# Patient Record
Sex: MALE | Race: BLACK OR AFRICAN AMERICAN | NOT HISPANIC OR LATINO | Employment: OTHER | RURAL
[De-identification: names, ages, dates, MRNs, and addresses within clinical notes are randomized per-mention and may not be internally consistent; named-entity substitution may affect disease eponyms.]

---

## 2020-05-21 ENCOUNTER — HISTORICAL (OUTPATIENT)
Dept: ADMINISTRATIVE | Facility: HOSPITAL | Age: 55
End: 2020-05-21

## 2020-05-21 LAB
ALBUMIN SERPL BCP-MCNC: 2.4 G/DL (ref 3.5–5)
ALBUMIN/GLOB SERPL: 0.5 {RATIO}
ALP SERPL-CCNC: 73 U/L (ref 45–115)
ALT SERPL W P-5'-P-CCNC: 41 U/L (ref 16–61)
AST SERPL W P-5'-P-CCNC: 39 U/L (ref 15–37)
BASOPHILS # BLD AUTO: 0.02 X10E3/UL (ref 0–0.2)
BASOPHILS NFR BLD AUTO: 0.3 % (ref 0–1)
BILIRUB SERPL-MCNC: 0.6 MG/DL (ref 0–1.2)
BUN SERPL-MCNC: 18 MG/DL (ref 7–18)
CALCIUM SERPL-MCNC: 8.2 MG/DL (ref 8.5–10.1)
CHLORIDE SERPL-SCNC: 101 MMOL/L (ref 98–107)
CO2 SERPL-SCNC: 28 MMOL/L (ref 21–32)
CREAT SERPL-MCNC: 2.05 MG/DL (ref 0.7–1.3)
EOSINOPHIL # BLD AUTO: 0.05 X10E3/UL (ref 0–0.5)
EOSINOPHIL NFR BLD AUTO: 0.6 % (ref 1–4)
ERYTHROCYTE [DISTWIDTH] IN BLOOD BY AUTOMATED COUNT: 15.4 % (ref 11.5–14.5)
GLOBULIN SER-MCNC: 4.6 G/DL (ref 2–4)
GLUCOSE SERPL-MCNC: 113 MG/DL (ref 74–106)
HCT VFR BLD AUTO: 32.2 % (ref 40–54)
HGB BLD-MCNC: 10.4 G/DL (ref 13.5–18)
LYMPHOCYTES # BLD AUTO: 2.66 X10E3/UL (ref 1–4.8)
LYMPHOCYTES NFR BLD AUTO: 34 % (ref 27–41)
MCH RBC QN AUTO: 26.5 PG (ref 27–31)
MCHC RBC AUTO-ENTMCNC: 32.3 G/DL (ref 32–36)
MCV RBC AUTO: 82 FL (ref 80–96)
MONOCYTES # BLD AUTO: 0.71 X10E3/UL (ref 0–0.8)
MONOCYTES NFR BLD AUTO: 9.1 % (ref 2–6)
MPC BLD CALC-MCNC: 9.8 FL (ref 9.4–12.4)
NEUTROPHILS # BLD AUTO: 4.39 X10E3/UL (ref 1.8–7.7)
NEUTROPHILS NFR BLD AUTO: 56 % (ref 53–65)
NT-PROBNP SERPL-MCNC: ABNORMAL PG/ML (ref 0–125)
PLATELET # BLD AUTO: 330 X10E3/UL (ref 150–450)
POTASSIUM SERPL-SCNC: 3.8 MMOL/L (ref 3.5–5.1)
PROT SERPL-MCNC: 7 G/DL (ref 6.4–8.2)
RBC # BLD AUTO: 3.93 X10E6/UL (ref 4.6–6.2)
SODIUM SERPL-SCNC: 137 MMOL/L (ref 136–145)
TROPONIN I SERPL-MCNC: 0.14 NG/ML (ref 0–0.06)
WBC # BLD AUTO: 7.83 X10E3/UL (ref 4.5–11)

## 2021-01-04 ENCOUNTER — HISTORICAL (OUTPATIENT)
Dept: ADMINISTRATIVE | Facility: HOSPITAL | Age: 56
End: 2021-01-04

## 2021-01-04 LAB
ALBUMIN SERPL BCP-MCNC: 4 G/DL (ref 3.5–5)
ALBUMIN/GLOB SERPL: 0.8 {RATIO}
ALP SERPL-CCNC: 57 U/L (ref 45–115)
ALT SERPL W P-5'-P-CCNC: 33 U/L (ref 16–61)
ANION GAP SERPL CALCULATED.3IONS-SCNC: 16 MMOL/L (ref 7–16)
APTT PPP: 25.9 SECONDS (ref 25.2–37.3)
AST SERPL W P-5'-P-CCNC: 86 U/L (ref 15–37)
BASOPHILS # BLD AUTO: 0.05 X10E3/UL (ref 0–0.2)
BASOPHILS NFR BLD AUTO: 0.5 % (ref 0–1)
BILIRUB SERPL-MCNC: 1.3 MG/DL (ref 0–1.2)
BUN SERPL-MCNC: 34 MG/DL (ref 7–18)
CALCIUM SERPL-MCNC: 9.8 MG/DL (ref 8.5–10.1)
CHLORIDE SERPL-SCNC: 102 MMOL/L (ref 98–107)
CO2 SERPL-SCNC: 25 MMOL/L (ref 21–32)
CREAT SERPL-MCNC: 1.98 MG/DL (ref 0.7–1.3)
EOSINOPHIL # BLD AUTO: 0.02 X10E3/UL (ref 0–0.5)
EOSINOPHIL NFR BLD AUTO: 0.2 % (ref 1–4)
ERYTHROCYTE [DISTWIDTH] IN BLOOD BY AUTOMATED COUNT: 14.6 % (ref 11.5–14.5)
ETHANOL SERPL-MCNC: <3 MG/DL (ref 0–10)
GLOBULIN SER-MCNC: 5.2 G/DL (ref 2–4)
GLUCOSE SERPL-MCNC: 109 MG/DL (ref 70–105)
GLUCOSE SERPL-MCNC: 114 MG/DL (ref 74–106)
HCT VFR BLD AUTO: 44 % (ref 40–54)
HGB BLD-MCNC: 15 G/DL (ref 13.5–18)
INR BLD: 1.1 (ref 0.9–1.1)
LYMPHOCYTES # BLD AUTO: 2.01 X10E3/UL (ref 1–4.8)
LYMPHOCYTES NFR BLD AUTO: 19.2 % (ref 27–41)
MAGNESIUM SERPL-MCNC: 2.6 MG/DL (ref 1.7–2.3)
MCH RBC QN AUTO: 28.2 PG (ref 27–31)
MCHC RBC AUTO-ENTMCNC: 34.1 G/DL (ref 32–36)
MCV RBC AUTO: 83 FL (ref 80–96)
MONOCYTES # BLD AUTO: 1 X10E3/UL (ref 0–0.8)
MONOCYTES NFR BLD AUTO: 9.5 % (ref 2–6)
MPC BLD CALC-MCNC: 11.6 FL (ref 9.4–12.4)
NEUTROPHILS # BLD AUTO: 7.4 X10E3/UL (ref 1.8–7.7)
NEUTROPHILS NFR BLD AUTO: 70.6 % (ref 53–65)
NT-PROBNP SERPL-MCNC: 6109 PG/ML (ref 0–125)
PLATELET # BLD AUTO: 167 X10E3/UL (ref 150–450)
POTASSIUM SERPL-SCNC: 5.1 MMOL/L (ref 3.5–5.1)
PROT SERPL-MCNC: 9.2 G/DL (ref 6.4–8.2)
PROTHROMBIN TIME: 13.7 SECONDS (ref 11.7–14.7)
RBC # BLD AUTO: 5.32 X10E6/UL (ref 4.6–6.2)
SODIUM SERPL-SCNC: 138 MMOL/L (ref 136–145)
TROPONIN I SERPL-MCNC: 0.54 NG/ML (ref 0–0.06)
WBC # BLD AUTO: 10.48 X10E3/UL (ref 4.5–11)

## 2022-03-03 ENCOUNTER — HOSPITAL ENCOUNTER (EMERGENCY)
Facility: HOSPITAL | Age: 57
Discharge: LEFT BEFORE DISCHARGE | End: 2022-03-03

## 2022-03-03 VITALS
OXYGEN SATURATION: 99 % | RESPIRATION RATE: 18 BRPM | HEART RATE: 83 BPM | TEMPERATURE: 99 F | WEIGHT: 207 LBS | HEIGHT: 66 IN | DIASTOLIC BLOOD PRESSURE: 98 MMHG | BODY MASS INDEX: 33.27 KG/M2 | SYSTOLIC BLOOD PRESSURE: 156 MMHG

## 2022-03-03 DIAGNOSIS — I16.0 HYPERTENSIVE URGENCY: Primary | ICD-10-CM

## 2022-03-03 DIAGNOSIS — R51.9 ACUTE NONINTRACTABLE HEADACHE, UNSPECIFIED HEADACHE TYPE: ICD-10-CM

## 2022-03-03 DIAGNOSIS — Z53.29 LEFT AGAINST MEDICAL ADVICE: ICD-10-CM

## 2022-03-03 DIAGNOSIS — R79.89 ELEVATED TROPONIN I LEVEL: ICD-10-CM

## 2022-03-03 DIAGNOSIS — R51.9 SEVERE HEADACHE: ICD-10-CM

## 2022-03-03 LAB
ALBUMIN SERPL BCP-MCNC: 2.9 G/DL (ref 3.5–5)
ALBUMIN/GLOB SERPL: 0.6 {RATIO}
ALP SERPL-CCNC: 64 U/L (ref 45–115)
ALT SERPL W P-5'-P-CCNC: 11 U/L (ref 16–61)
AMPHET UR QL SCN: NEGATIVE
ANION GAP SERPL CALCULATED.3IONS-SCNC: 10 MMOL/L (ref 7–16)
APTT PPP: 28.5 SECONDS (ref 25.2–37.3)
AST SERPL W P-5'-P-CCNC: 17 U/L (ref 15–37)
BACTERIA #/AREA URNS HPF: NORMAL /HPF
BARBITURATES UR QL SCN: NEGATIVE
BASOPHILS # BLD AUTO: 0.02 K/UL (ref 0–0.2)
BASOPHILS NFR BLD AUTO: 0.3 % (ref 0–1)
BENZODIAZ METAB UR QL SCN: NEGATIVE
BILIRUB SERPL-MCNC: 0.3 MG/DL (ref 0–1.2)
BILIRUB UR QL STRIP: NEGATIVE
BUN SERPL-MCNC: 19 MG/DL (ref 7–18)
BUN/CREAT SERPL: 11 (ref 6–20)
CALCIUM SERPL-MCNC: 8.7 MG/DL (ref 8.5–10.1)
CHLORIDE SERPL-SCNC: 100 MMOL/L (ref 98–107)
CLARITY UR: CLEAR
CO2 SERPL-SCNC: 31 MMOL/L (ref 21–32)
COCAINE UR QL SCN: NEGATIVE
COLOR UR: ABNORMAL
CREAT SERPL-MCNC: 1.68 MG/DL (ref 0.7–1.3)
DIFFERENTIAL METHOD BLD: ABNORMAL
EOSINOPHIL # BLD AUTO: 0.07 K/UL (ref 0–0.5)
EOSINOPHIL NFR BLD AUTO: 1.1 % (ref 1–4)
ERYTHROCYTE [DISTWIDTH] IN BLOOD BY AUTOMATED COUNT: 13.9 % (ref 11.5–14.5)
GLOBULIN SER-MCNC: 4.8 G/DL (ref 2–4)
GLUCOSE SERPL-MCNC: 104 MG/DL (ref 74–106)
GLUCOSE UR STRIP-MCNC: NEGATIVE MG/DL
HCT VFR BLD AUTO: 45 % (ref 40–54)
HGB BLD-MCNC: 15.1 G/DL (ref 13.5–18)
INR BLD: 0.94 (ref 0.9–1.1)
KETONES UR STRIP-SCNC: ABNORMAL MG/DL
LEUKOCYTE ESTERASE UR QL STRIP: ABNORMAL
LYMPHOCYTES # BLD AUTO: 2.02 K/UL (ref 1–4.8)
LYMPHOCYTES NFR BLD AUTO: 32.6 % (ref 27–41)
MCH RBC QN AUTO: 27.5 PG (ref 27–31)
MCHC RBC AUTO-ENTMCNC: 33.6 G/DL (ref 32–36)
MCV RBC AUTO: 81.8 FL (ref 80–96)
MDA UR QL SCN: NEGATIVE
METHADONE UR QL SCN: NEGATIVE
METHAMPHET UR QL SCN: POSITIVE
MONOCYTES # BLD AUTO: 0.65 K/UL (ref 0–0.8)
MONOCYTES NFR BLD AUTO: 10.5 % (ref 2–6)
MPC BLD CALC-MCNC: 10.2 FL (ref 9.4–12.4)
NEUTROPHILS # BLD AUTO: 3.44 K/UL (ref 1.8–7.7)
NEUTROPHILS NFR BLD AUTO: 55.5 % (ref 53–65)
NITRITE UR QL STRIP: NEGATIVE
OPIATES UR QL SCN: NEGATIVE
OXYCODONE UR QL SCN: NEGATIVE
PCP UR QL SCN: NEGATIVE
PH UR STRIP: 5 PH UNITS
PLATELET # BLD AUTO: 243 K/UL (ref 150–400)
POTASSIUM SERPL-SCNC: 3.9 MMOL/L (ref 3.5–5.1)
PROT SERPL-MCNC: 7.7 G/DL (ref 6.4–8.2)
PROT UR QL STRIP: 100
PROTHROMBIN TIME: 12.7 SECONDS (ref 11.7–14.7)
RBC # BLD AUTO: 5.5 M/UL (ref 4.6–6.2)
RBC # UR STRIP: ABNORMAL /UL
RBC #/AREA URNS HPF: NORMAL /HPF
SODIUM SERPL-SCNC: 137 MMOL/L (ref 136–145)
SP GR UR STRIP: 1.02
THC UR QL SCN: NEGATIVE
TRICYCLICS UR QL SCN: NEGATIVE
TROPONIN I SERPL HS-MCNC: 106.5 PG/ML
UROBILINOGEN UR STRIP-ACNC: 0.2 MG/DL
WBC # BLD AUTO: 6.2 K/UL (ref 4.5–11)
WBC #/AREA URNS HPF: NORMAL /HPF

## 2022-03-03 PROCEDURE — 85730 THROMBOPLASTIN TIME PARTIAL: CPT | Performed by: NURSE PRACTITIONER

## 2022-03-03 PROCEDURE — 99284 EMERGENCY DEPT VISIT MOD MDM: CPT | Mod: ,,, | Performed by: NURSE PRACTITIONER

## 2022-03-03 PROCEDURE — 99284 EMERGENCY DEPT VISIT MOD MDM: CPT | Mod: 25

## 2022-03-03 PROCEDURE — 85025 COMPLETE CBC W/AUTO DIFF WBC: CPT | Performed by: NURSE PRACTITIONER

## 2022-03-03 PROCEDURE — 80053 COMPREHEN METABOLIC PANEL: CPT | Performed by: NURSE PRACTITIONER

## 2022-03-03 PROCEDURE — 85610 PROTHROMBIN TIME: CPT | Performed by: NURSE PRACTITIONER

## 2022-03-03 PROCEDURE — 80305 DRUG TEST PRSMV DIR OPT OBS: CPT | Performed by: NURSE PRACTITIONER

## 2022-03-03 PROCEDURE — 93010 ELECTROCARDIOGRAM REPORT: CPT | Mod: ,,, | Performed by: INTERNAL MEDICINE

## 2022-03-03 PROCEDURE — 93005 ELECTROCARDIOGRAM TRACING: CPT

## 2022-03-03 PROCEDURE — 96361 HYDRATE IV INFUSION ADD-ON: CPT

## 2022-03-03 PROCEDURE — 93010 EKG 12-LEAD: ICD-10-PCS | Mod: ,,, | Performed by: INTERNAL MEDICINE

## 2022-03-03 PROCEDURE — 84484 ASSAY OF TROPONIN QUANT: CPT | Performed by: NURSE PRACTITIONER

## 2022-03-03 PROCEDURE — 81001 URINALYSIS AUTO W/SCOPE: CPT | Performed by: NURSE PRACTITIONER

## 2022-03-03 PROCEDURE — 96375 TX/PRO/DX INJ NEW DRUG ADDON: CPT

## 2022-03-03 PROCEDURE — 99499 NO LOS: ICD-10-PCS | Mod: S$PBB,,, | Performed by: PSYCHIATRY & NEUROLOGY

## 2022-03-03 PROCEDURE — 36415 COLL VENOUS BLD VENIPUNCTURE: CPT | Performed by: NURSE PRACTITIONER

## 2022-03-03 PROCEDURE — 99499 UNLISTED E&M SERVICE: CPT | Mod: S$PBB,,, | Performed by: PSYCHIATRY & NEUROLOGY

## 2022-03-03 PROCEDURE — 25000003 PHARM REV CODE 250: Performed by: NURSE PRACTITIONER

## 2022-03-03 PROCEDURE — 99284 PR EMERGENCY DEPT VISIT,LEVEL IV: ICD-10-PCS | Mod: ,,, | Performed by: NURSE PRACTITIONER

## 2022-03-03 PROCEDURE — 63600175 PHARM REV CODE 636 W HCPCS: Performed by: NURSE PRACTITIONER

## 2022-03-03 PROCEDURE — 96376 TX/PRO/DX INJ SAME DRUG ADON: CPT

## 2022-03-03 PROCEDURE — 96374 THER/PROPH/DIAG INJ IV PUSH: CPT

## 2022-03-03 RX ORDER — HYDROCHLOROTHIAZIDE 12.5 MG/1
12.5 TABLET ORAL
Status: COMPLETED | OUTPATIENT
Start: 2022-03-03 | End: 2022-03-03

## 2022-03-03 RX ORDER — LISINOPRIL 40 MG/1
40 TABLET ORAL DAILY
Qty: 30 TABLET | Refills: 0 | Status: SHIPPED | OUTPATIENT
Start: 2022-03-03 | End: 2022-10-21

## 2022-03-03 RX ORDER — ASPIRIN 325 MG
325 TABLET ORAL
Status: COMPLETED | OUTPATIENT
Start: 2022-03-03 | End: 2022-03-03

## 2022-03-03 RX ORDER — LABETALOL HYDROCHLORIDE 5 MG/ML
10 INJECTION, SOLUTION INTRAVENOUS
Status: COMPLETED | OUTPATIENT
Start: 2022-03-03 | End: 2022-03-03

## 2022-03-03 RX ORDER — HYDROCHLOROTHIAZIDE 12.5 MG/1
12.5 TABLET ORAL DAILY
Qty: 30 TABLET | Refills: 0 | Status: SHIPPED | OUTPATIENT
Start: 2022-03-03 | End: 2022-09-27

## 2022-03-03 RX ORDER — LISINOPRIL 20 MG/1
40 TABLET ORAL
Status: COMPLETED | OUTPATIENT
Start: 2022-03-03 | End: 2022-03-03

## 2022-03-03 RX ORDER — KETOROLAC TROMETHAMINE 30 MG/ML
15 INJECTION, SOLUTION INTRAMUSCULAR; INTRAVENOUS
Status: COMPLETED | OUTPATIENT
Start: 2022-03-03 | End: 2022-03-03

## 2022-03-03 RX ADMIN — LABETALOL HYDROCHLORIDE 10 MG: 5 INJECTION INTRAVENOUS at 05:03

## 2022-03-03 RX ADMIN — LABETALOL HYDROCHLORIDE 10 MG: 5 INJECTION INTRAVENOUS at 03:03

## 2022-03-03 RX ADMIN — SODIUM CHLORIDE 1000 ML: 9 INJECTION, SOLUTION INTRAVENOUS at 05:03

## 2022-03-03 RX ADMIN — HYDROCHLOROTHIAZIDE 12.5 MG: 12.5 TABLET ORAL at 05:03

## 2022-03-03 RX ADMIN — LISINOPRIL 40 MG: 20 TABLET ORAL at 05:03

## 2022-03-03 RX ADMIN — ASPIRIN 325 MG ORAL TABLET 325 MG: 325 PILL ORAL at 05:03

## 2022-03-03 RX ADMIN — KETOROLAC TROMETHAMINE 15 MG: 30 INJECTION, SOLUTION INTRAMUSCULAR at 05:03

## 2022-03-03 NOTE — ED PROVIDER NOTES
Encounter Date: 3/3/2022       History     Chief Complaint   Patient presents with    Headache     56-year-old  male with history of hypertension, heart disease, drug abuse and intracranial vasculopathy presents to the emergency room today with right frontal headache for the past 3 days.  Patient reports he has been out of his blood pressure medications for the past 3 months.  Patient has no PCP.  Patient denies any dizziness, fever, sensitivity to light/sound, vision changes, neck pain, chest pain, shortness of breath.  When reviewing the patient's past medical history is, noted that patient was having headaches when the intracranial vasculopathy was diagnosed.  Patient reports this headache is, way worse than that.  Patient was followed by Neurology at a hospital in [place].    The history is provided by the patient.   Headache   This is a new problem. Episode onset: 3 days. The problem occurs constantly. The problem has been unchanged. The pain is located in the frontal region. The pain does not radiate. The pain quality is not similar to prior headaches. The quality of the pain is described as throbbing. The pain is at a severity of 5/10. Pertinent negatives include no abdominal pain, abnormal behavior, anorexia, back pain, blurred vision, coughing, dizziness, drainage, ear pain, eye pain, eye redness, eye watering, facial sweating, fever, hearing loss, insomnia, loss of balance, muscle aches, nausea, neck pain, numbness, phonophobia, photophobia, rhinorrhea, scalp tenderness, seizures, sinus pressure, sore throat, swollen glands, tingling, tinnitus, visual change, vomiting, weakness or weight loss. Nothing aggravates the symptoms. He has tried NSAIDs for the symptoms. The treatment provided no relief. His past medical history is significant for hypertension and obesity. There is no history of cancer, cluster headaches, immunosuppression, migraine headaches, pseudotumor cerebri, recent head  traumas, sinus disease or TMJ.     Review of patient's allergies indicates:  No Known Allergies  Past Medical History:   Diagnosis Date    Hypertension     Mixed hyperlipidemia      Past Surgical History:   Procedure Laterality Date    CARDIAC SURGERY       History reviewed. No pertinent family history.  Social History     Tobacco Use    Smoking status: Never Smoker    Smokeless tobacco: Current User     Types: Snuff   Substance Use Topics    Alcohol use: Yes     Alcohol/week: 12.0 standard drinks     Types: 12 Cans of beer per week    Drug use: Not Currently     Review of Systems   Constitutional: Negative for fever and weight loss.   HENT: Negative for ear pain, hearing loss, rhinorrhea, sinus pressure, sore throat and tinnitus.    Eyes: Negative for blurred vision, photophobia, pain and redness.   Respiratory: Negative for cough and shortness of breath.    Cardiovascular: Negative for chest pain.   Gastrointestinal: Negative for abdominal pain, anorexia, nausea and vomiting.   Genitourinary: Negative for dysuria.   Musculoskeletal: Negative for back pain and neck pain.   Skin: Negative for rash.   Neurological: Positive for headaches. Negative for dizziness, tingling, tremors, seizures, syncope, facial asymmetry, speech difficulty, weakness, light-headedness, numbness and loss of balance.   Hematological: Does not bruise/bleed easily.   Psychiatric/Behavioral: The patient does not have insomnia.        Physical Exam     Initial Vitals [03/03/22 1455]   BP Pulse Resp Temp SpO2   (!) 206/135 98 18 99.2 °F (37.3 °C) 98 %      MAP       --         Physical Exam    Nursing note and vitals reviewed.  Constitutional: He appears well-developed and well-nourished. He is not diaphoretic. No distress.   HENT:   Head: Normocephalic and atraumatic.   Eyes: Pupils are equal, round, and reactive to light. Right pupil is round and reactive. Left pupil is round and reactive. Pupils are equal.   Neck: Neck supple.    Cardiovascular: Normal rate, regular rhythm, normal heart sounds and intact distal pulses. Exam reveals no gallop and no friction rub.    No murmur heard.  Pulmonary/Chest: Breath sounds normal. No respiratory distress. He has no wheezes. He has no rhonchi. He has no rales. He exhibits no tenderness.   Musculoskeletal:      Cervical back: Neck supple.     Neurological: He is alert and oriented to person, place, and time. He has normal strength. He is not disoriented. No cranial nerve deficit. GCS score is 15. GCS eye subscore is 4. GCS verbal subscore is 5. GCS motor subscore is 6.   Skin: Skin is warm and dry. Capillary refill takes less than 2 seconds.   Psychiatric: He has a normal mood and affect.         Medical Screening Exam   See Full Note    ED Course   Procedures  Labs Reviewed   COMPREHENSIVE METABOLIC PANEL - Abnormal; Notable for the following components:       Result Value    BUN 19 (*)     Creatinine 1.68 (*)     Albumin 2.9 (*)     Globulin 4.8 (*)     ALT 11 (*)     eGFR 45 (*)     All other components within normal limits   URINALYSIS, REFLEX TO URINE CULTURE - Abnormal; Notable for the following components:    Leukocytes, UA Trace (*)     Protein,   (*)     Blood, UA Small (*)     All other components within normal limits   DRUG SCREEN, URINE (BEAKER) - Abnormal; Notable for the following components:    Methamphetamines Positive (*)     All other components within normal limits   CBC WITH DIFFERENTIAL - Abnormal; Notable for the following components:    Monocytes % 10.5 (*)     All other components within normal limits   TROPONIN I - Abnormal; Notable for the following components:    Troponin I High Sensitivity 106.5 (*)     All other components within normal limits   URINALYSIS, MICROSCOPIC - Normal   APTT - Normal   PROTIME-INR - Normal   CBC W/ AUTO DIFFERENTIAL    Narrative:     The following orders were created for panel order CBC auto differential.  Procedure                                Abnormality         Status                     ---------                               -----------         ------                     CBC with Differential[[id number]]        Abnormal            Final result                 Please view results for these tests on the individual orders.     EKG Readings: (Independently Interpreted)   Initial Reading: No STEMI. Rhythm: Normal Sinus Rhythm. Heart Rate: 92. Ectopy: No Ectopy. Conduction: Normal. ST Segments: Normal ST Segments. T Waves: Normal. Axis: Normal. Other Findings: Prolonged QT Interval. Clinical Impression: Normal Sinus Rhythm     ECG Results          EKG 12-lead (In process)  Result time 03/03/22 17:12:52    In process by Interface, Lab In [facility] (03/03/22 17:12:52)                 Narrative:    Test Reason : R51.9,    Vent. Rate : 092 BPM     Atrial Rate : 092 BPM     P-R Int : 118 ms          QRS Dur : 088 ms      QT Int : 378 ms       P-R-T Axes : 040 -32 116 degrees     QTc Int : 467 ms    Normal sinus rhythm  Left axis deviation  Nonspecific T wave abnormality  Prolonged QT  Abnormal ECG  No previous ECGs available    Referred By: [email or web address]   SELF           Confirmed By:                             Imaging Results          X-Ray Chest AP Portable (In process)                CT Head Without Contrast (Final result)  Result time 03/03/22 15:28:35    Final result by Baltazar Piedra DO (03/03/22 15:28:35)                 Impression:      No convincing evidence of acute intracranial hemorrhage. There is patchy subcortical hypoattenuation which is greatest within the left parietal region which is nonspecific but may reflect sequela of chronic microvascular ischemic change.  Demyelinating processes and vasculitis also within the differential.  MRI may be of benefit for further evaluation including too small areas of acute ischemia.    The CT exam was performed using one or more of the following dose    reduction techniques- Automated exposure control,  adjustment of the mA    and/or kV according to patient size, and/or use of iterative    reconstructed technique.    Point of Service: [facility]      Electronically signed by: Baltazar Piedra  Date:    03/03/2022  Time:    15:28             Narrative:    EXAMINATION:  CT HEAD WITHOUT CONTRAST    CLINICAL HISTORY:  HA, HTN, hx of intracranial vasculopathy;    COMPARISON:  None    TECHNIQUE:  Multiple axial tomographic images of the brain were obtained without the use of intravenous contrast.  .    FINDINGS:  Midline structures are nondisplaced.  No convincing evidence of acute intracranial hemorrhage. No convincing evidence of hydrocephalus.  There is patchy subcortical hypoattenuation which is greatest within the left parietal region which is nonspecific but may reflect sequela of chronic microvascular ischemic change. Visualized paranasal sinuses and mastoid air cells are predominantly clear.                                 Medications   labetaloL injection 10 mg (10 mg Intravenous Given 3/3/22 1557)   labetaloL injection 10 mg (10 mg Intravenous Given 3/3/22 1720)   sodium chloride 0.9% bolus 1,000 mL (0 mLs Intravenous Stopped 3/3/22 1817)   ketorolac injection 15 mg (15 mg Intravenous Given 3/3/22 1718)   lisinopriL tablet 40 mg (40 mg Oral Given 3/3/22 1749)   hydroCHLOROthiazide tablet 12.5 mg (12.5 mg Oral Given 3/3/22 1749)   aspirin tablet 325 mg (325 mg Oral Given 3/3/22 1743)                 ED Course as of 03/03/22 1923   u Mar 03, 2022   1533 Methamphetamines(!): Positive [AG]   1538 Occult Blood UA(!): Small [AG]   1538 Protein, UA(!): 100  [AG]   1541 Spoke with Dr Gonzalez's nurse at [facility] to get consult.  She reports Dr Gonzalez is out of the country at this time.  However, she is going to call their nurse practitioner. [AG]   1551 BUN(!): 19 [AG]   1551 Creatinine(!): 1.68 [AG]   1620 CTA H/N 4/15/21:  IMPRESSION:  1. No acute intracranial findings.  2. Stable patchy  hypodensities in bilateral periventricular and  subcortical white matter, in keeping with microangiopathic changes or  vasculitis.  3. No significant atherosclerotic disease in the neck arteries. Stable  multifocal stenosis in bilateral MCA M2 and M3 segments, proximal  basilar trunk, A2 segment of the right EITAN. No new areas of stenosis.  This may represent vasculitis or related to drug abuse as described  before. Intracranial atherosclerotic disease cannot be ruled out.  4. Reactive lymph nodes in the neck with interval enlargement of the  level 1A lymph node.     [AG]   1624 Patient reports he has not been taking his ASA or any of his prescribed medications. [AG]   1707 Pt reports HA is 5 or 6 on pain scale of 0-10. Telemed consult with Dr Nazario.  He is currently reviewing the CTA from 2021 and the CT from today, then he will do the visual consult with the patient.  He says to treat his HA and repeat the labetolol.  We will then give patient his home dosing of Lisinopril and HCTZ. [AG]   1726 Dr Sin did audio/visual consult with the patient.  He recommends treating the blood pressure and HA.  Will also be getting [facility] consult.   [AG]   1819 Consulted Dr Charlton with [facility].  Dr Teran is concerned for posterior reversible encephalopathy syndrome (PRES).  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  He recommends admission to a facility where he can get MRI, neuro consult and management of his HTN [AG]   1827 Talk to Dr. Piedra the radiologist with [facility] regarding the [place] head.  He reports there is a dictation error and the last sentence of the impression.  That sentence is supposed to say MRI may be of benefit for further evaluation including to exclude areas of acute ischemia. [AG]   1840 Troponin I High Sensitivity(!!): 106.5  No ST elevation on EKG. [AG]   1916 Pt is refusing to be transferred anywhere.  I have advised to the patient that is he  leaves there is risks of worsening of his condition or death.  He verbalizes understanding of this.  He is alert and oriented x3.  Denies HA at this time.  I explained to the patient that if he worsens, he needs to go to the closest emergency room.  I explained to the patient he needs close follow-up with a PCP, neurology and cardiology.  He verbalizes understanding of this.  I am going to send patient home with his blood pressure medication and I instructed him to get OTC ASA 81mg to take daily.  I also did counseling of the illicit drug use. [AG]      ED Course User Index  [AG] ROYER Dang          Clinical Impression:   Final diagnoses:  [R51.9] Severe headache  [R51.9] Acute nonintractable headache, unspecified headache type  [I16.0] Hypertensive urgency (Primary)  [R77.8] Elevated troponin I level  [Z53.29] Left against medical advice          ED Disposition Condition    AMA               ROYER Dang  03/03/22 1923

## 2022-03-03 NOTE — ED TRIAGE NOTES
Presented to ER with c/o a headache for 3 days Stated he has been out of his b/p  medication for approx 3 month. Also, c/o a toothache on the bottom right.

## 2022-03-04 NOTE — ED NOTES
Tele stroke consult faxed to [facility]. Called the [facility] to confirm they received the consult and answered basic questions regarding pt.

## 2022-03-04 NOTE — CONSULTS
[facility]  Vascular Neurology  Comprehensive Stroke Center  TeleVascular Neurology Acute Consultation Note      Consults    Consulting Provider: KHAI LIM  Current Providers  No providers found    Patient Location:  [facility] EMERGENCY DEPART* Emergency Department  Spoke hospital nurse at bedside with patient assisting consultant.     Patient information was obtained from primary team.         Assessment/Plan:   Patient at [facility] with headache x 3-4 days and hypertension. NIHSS=0 per ER NP.     CT READ: Yes  No hemmorhage. No mass effect. No early infarct signs. Sub-cortical white matter left parietal and temporal hypodensity changes. No convincing evidence of acute intracranial hemorrhage. There is patchy subcortical hypoattenuation which is greatest within the left parietal region which is nonspecific but may reflect sequela of chronic microvascular ischemic change.  Demyelinating processes and vasculitis also within the differential.  MRI may be of benefit for further evaluation including too small areas of acute ischemia.        I recommended neuro consult and admission for MRI. Given symptoms of headache, hypertension and white matter hypodensity in left parietal and temporal regions, it could be PRES    Aspirin, brain vessel imaging along with MRI brain and BP control.       Diagnoses:   Stroke like symptoms    STROKE DOCUMENTATION     Acute Stroke Times:   Acute Stroke Times   Unknown Normal Date: Unknown Date  Unknown Normal Time: Unknown Time  Unknown Symptom Onset Time: Unknown Time  Stroke Team Called Date: 03/03/22  Stroke Team Called Time: 1759  Stroke Team Arrival Date: 03/03/22  Stroke Team Arrival Time: 1759  Alteplase Recommended: No    NIH Scale:  1a. Level of Consciousness: 0-->Alert, keenly responsive  1b. LOC Questions: 0-->Answers both questions correctly  1c. LOC Commands: 0-->Performs both tasks correctly  2. Best Gaze: 0-->Normal  3. Visual:  "0-->No visual loss  4. Facial Palsy: 0-->Normal symmetrical movements  5a. Motor Arm, Left: 0-->No drift, limb holds 90 (or 45) degrees for full 10 secs  5b. Motor Arm, Right: 0-->No drift, limb holds 90 (or 45) degrees for full 10 secs  6a. Motor Leg, Left: 0-->No drift, leg holds 30 degree position for full 5 secs  6b. Motor Leg, Right: 0-->No drift, leg holds 30 degree position for full 5 secs  7. Limb Ataxia: 0-->Absent  8. Sensory: 0-->Normal, no sensory loss  9. Best Language: 0-->No aphasia, normal  10. Dysarthria: 0-->Normal  11. Extinction and Inattention (formerly Neglect): 0-->No abnormality  Total (NIH Stroke Scale): 0     Modified Mineral    Deysi Coma Scale:    ABCD2 Score:    [id number] Score:   HAS -BLED Score:   ICH Score:   Hunt & Adam Classification:       Blood pressure (!) 156/98, pulse 83, temperature 99.2 °F (37.3 °C), temperature source Oral, resp. rate 18, height 5' 6" (1.676 m), weight 93.9 kg (207 lb), SpO2 99 %.  Alteplase Eligible?: No  Alteplase Recommendation: Alteplase not recommended due to Outside of treatment window   Possible Interventional Revascularization Candidate? No; at this time symptoms not suggestive of large vessel occlusion    Disposition Recommendation: admit to inpatient  transfer to nearest appropriate facility     Subjective:     History of Present Illness:  Patient at [facility] with headache x 3-4 days and hypertension.          Woke up with symptoms?: no    Recent bleeding noted: no  Does the patient take any Blood Thinners? no  Medications: No relevant medications      Past Medical History: hypertension    Past Surgical History: no major surgeries within the last 2 weeks    Family History: no relevant history    Social History: unable to obtain    Allergies: No Known Allergies No relevant allergies    Review of Systems  Objective:   Vitals: Blood pressure (!) 156/98, pulse 83, temperature 99.2 °F (37.3 °C), temperature source Oral, resp. rate 18, height " "5' 6" (1.676 m), weight 93.9 kg (207 lb), SpO2 99 %. BP: 206/135    CT READ: Yes  No hemmorhage. No mass effect. No early infarct signs. Sub-cortical white matter left parietal and temporal hypodensity changes.     Physical Exam          Recommended the emergency room physician to have a brief discussion with the patient and/or family if available regarding the  risks and benefits of treatment, and to briefly document the occurrence of that discussion in his clinical encounter note.     The attending portion of this evaluation, treatment, and documentation was performed per Emiliana Charlton MD via audio only.    Billing code: No LOS (audio only consult)    In your opinion, this was a: Tier 2 Van Negative    Consult End Time: 9:12 PM     Emiliana Charlton MD  [facility]  Vascular Neurology   [facility]  "

## 2022-03-04 NOTE — DISCHARGE INSTRUCTIONS
Low-sodium diet.  Take medications as prescribed.  Keep a log of your blood pressure to take to your family practice clinic for your appointment.  I suggest you follow up 1st thing in the morning.  If your symptoms worsen, go to the nearest emergency room for evaluation.  Get over-the-counter aspirin 81 mg to take daily.  Please return to the emergency room for any concerning symptoms.  Please stop using illicit drugs.

## 2022-03-04 NOTE — SUBJECTIVE & OBJECTIVE
"  Woke up with symptoms?: no    Recent bleeding noted: no  Does the patient take any Blood Thinners? no  Medications: No relevant medications      Past Medical History: hypertension    Past Surgical History: no major surgeries within the last 2 weeks    Family History: no relevant history    Social History: unable to obtain    Allergies: No Known Allergies No relevant allergies    Review of Systems  Objective:   Vitals: Blood pressure (!) 156/98, pulse 83, temperature 99.2 °F (37.3 °C), temperature source Oral, resp. rate 18, height 5' 6" (1.676 m), weight 93.9 kg (207 lb), SpO2 99 %. BP: 206/135    CT READ: Yes  No hemmorhage. No mass effect. No early infarct signs. Sub-cortical white matter left parietal and temporal hypodensity changes.     Physical Exam      "

## 2022-08-30 ENCOUNTER — HOSPITAL ENCOUNTER (EMERGENCY)
Facility: HOSPITAL | Age: 57
Discharge: HOME | End: 2022-08-30
Payer: MEDICAID

## 2022-08-30 VITALS
HEIGHT: 65 IN | OXYGEN SATURATION: 100 % | WEIGHT: 165 LBS | SYSTOLIC BLOOD PRESSURE: 120 MMHG | TEMPERATURE: 98 F | HEART RATE: 89 BPM | RESPIRATION RATE: 18 BRPM | DIASTOLIC BLOOD PRESSURE: 74 MMHG | BODY MASS INDEX: 27.49 KG/M2

## 2022-08-30 DIAGNOSIS — M10.9 GOUTY ARTHRITIS OF BOTH KNEES: Primary | ICD-10-CM

## 2022-08-30 DIAGNOSIS — M10.9 ACUTE GOUT OF RIGHT HAND, UNSPECIFIED CAUSE: ICD-10-CM

## 2022-08-30 LAB
ALBUMIN SERPL BCP-MCNC: 2.3 G/DL (ref 3.5–5)
ALBUMIN/GLOB SERPL: 0.4 {RATIO}
ALP SERPL-CCNC: 46 U/L (ref 45–115)
ALT SERPL W P-5'-P-CCNC: 12 U/L (ref 16–61)
ANION GAP SERPL CALCULATED.3IONS-SCNC: 14 MMOL/L (ref 7–16)
AST SERPL W P-5'-P-CCNC: 14 U/L (ref 15–37)
BASOPHILS # BLD AUTO: 0.02 K/UL (ref 0–0.2)
BASOPHILS NFR BLD AUTO: 0.3 % (ref 0–1)
BILIRUB SERPL-MCNC: 0.4 MG/DL (ref ?–1.2)
BUN SERPL-MCNC: 27 MG/DL (ref 7–18)
BUN/CREAT SERPL: 16 (ref 6–20)
CALCIUM SERPL-MCNC: 9.2 MG/DL (ref 8.5–10.1)
CHLORIDE SERPL-SCNC: 97 MMOL/L (ref 98–107)
CO2 SERPL-SCNC: 26 MMOL/L (ref 21–32)
CREAT SERPL-MCNC: 1.68 MG/DL (ref 0.7–1.3)
DIFFERENTIAL METHOD BLD: ABNORMAL
EGFR (NO RACE VARIABLE) (RUSH/TITUS): 47 ML/MIN/1.73M²
EOSINOPHIL # BLD AUTO: 0.04 K/UL (ref 0–0.5)
EOSINOPHIL NFR BLD AUTO: 0.6 % (ref 1–4)
ERYTHROCYTE [DISTWIDTH] IN BLOOD BY AUTOMATED COUNT: 14.6 % (ref 11.5–14.5)
GLOBULIN SER-MCNC: 5.8 G/DL (ref 2–4)
GLUCOSE SERPL-MCNC: 114 MG/DL (ref 74–106)
HCT VFR BLD AUTO: 30.8 % (ref 40–54)
HGB BLD-MCNC: 10.1 G/DL (ref 13.5–18)
LYMPHOCYTES # BLD AUTO: 1.57 K/UL (ref 1–4.8)
LYMPHOCYTES NFR BLD AUTO: 25.3 % (ref 27–41)
MCH RBC QN AUTO: 26.4 PG (ref 27–31)
MCHC RBC AUTO-ENTMCNC: 32.8 G/DL (ref 32–36)
MCV RBC AUTO: 80.6 FL (ref 80–96)
MONOCYTES # BLD AUTO: 0.81 K/UL (ref 0–0.8)
MONOCYTES NFR BLD AUTO: 13.1 % (ref 2–6)
MPC BLD CALC-MCNC: 9.4 FL (ref 9.4–12.4)
NEUTROPHILS # BLD AUTO: 3.76 K/UL (ref 1.8–7.7)
NEUTROPHILS NFR BLD AUTO: 60.7 % (ref 53–65)
PLATELET # BLD AUTO: 384 K/UL (ref 150–400)
POTASSIUM SERPL-SCNC: 4.1 MMOL/L (ref 3.5–5.1)
PROT SERPL-MCNC: 8.1 G/DL (ref 6.4–8.2)
RBC # BLD AUTO: 3.82 M/UL (ref 4.6–6.2)
SODIUM SERPL-SCNC: 133 MMOL/L (ref 136–145)
URATE SERPL-MCNC: 8.4 MG/DL (ref 3.5–7.2)
WBC # BLD AUTO: 6.2 K/UL (ref 4.5–11)

## 2022-08-30 PROCEDURE — 99284 PR EMERGENCY DEPT VISIT,LEVEL IV: ICD-10-PCS | Mod: ,,,

## 2022-08-30 PROCEDURE — 96374 THER/PROPH/DIAG INJ IV PUSH: CPT

## 2022-08-30 PROCEDURE — 80053 COMPREHEN METABOLIC PANEL: CPT

## 2022-08-30 PROCEDURE — 36415 COLL VENOUS BLD VENIPUNCTURE: CPT

## 2022-08-30 PROCEDURE — 85025 COMPLETE CBC W/AUTO DIFF WBC: CPT

## 2022-08-30 PROCEDURE — 84550 ASSAY OF BLOOD/URIC ACID: CPT

## 2022-08-30 PROCEDURE — 96361 HYDRATE IV INFUSION ADD-ON: CPT

## 2022-08-30 PROCEDURE — 99284 EMERGENCY DEPT VISIT MOD MDM: CPT | Mod: ,,,

## 2022-08-30 PROCEDURE — 63600175 PHARM REV CODE 636 W HCPCS

## 2022-08-30 PROCEDURE — 25000003 PHARM REV CODE 250

## 2022-08-30 PROCEDURE — 99284 EMERGENCY DEPT VISIT MOD MDM: CPT | Mod: 25

## 2022-08-30 PROCEDURE — 96375 TX/PRO/DX INJ NEW DRUG ADDON: CPT

## 2022-08-30 RX ORDER — MELOXICAM 7.5 MG/1
7.5 TABLET ORAL DAILY
Qty: 30 TABLET | Refills: 0 | Status: SHIPPED | OUTPATIENT
Start: 2022-08-30 | End: 2022-10-21

## 2022-08-30 RX ORDER — COLCHICINE 0.6 MG/1
0.6 TABLET ORAL 2 TIMES DAILY
Qty: 60 TABLET | Refills: 0 | Status: SHIPPED | OUTPATIENT
Start: 2022-08-30 | End: 2022-09-27

## 2022-08-30 RX ORDER — ALLOPURINOL 100 MG/1
100 TABLET ORAL DAILY
Qty: 30 TABLET | Refills: 0 | Status: SHIPPED | OUTPATIENT
Start: 2022-08-30 | End: 2023-05-23 | Stop reason: SDUPTHER

## 2022-08-30 RX ORDER — METHYLPREDNISOLONE 4 MG/1
TABLET ORAL
Qty: 1 EACH | Refills: 0 | Status: SHIPPED | OUTPATIENT
Start: 2022-08-30 | End: 2022-09-20

## 2022-08-30 RX ORDER — DEXAMETHASONE SODIUM PHOSPHATE 4 MG/ML
12 INJECTION, SOLUTION INTRA-ARTICULAR; INTRALESIONAL; INTRAMUSCULAR; INTRAVENOUS; SOFT TISSUE
Status: DISCONTINUED | OUTPATIENT
Start: 2022-08-30 | End: 2022-08-30

## 2022-08-30 RX ORDER — KETOROLAC TROMETHAMINE 30 MG/ML
30 INJECTION, SOLUTION INTRAMUSCULAR; INTRAVENOUS
Status: DISCONTINUED | OUTPATIENT
Start: 2022-08-30 | End: 2022-08-30

## 2022-08-30 RX ORDER — KETOROLAC TROMETHAMINE 30 MG/ML
60 INJECTION, SOLUTION INTRAMUSCULAR; INTRAVENOUS ONCE
Status: DISCONTINUED | OUTPATIENT
Start: 2022-08-30 | End: 2022-08-30

## 2022-08-30 RX ORDER — KETOROLAC TROMETHAMINE 30 MG/ML
15 INJECTION, SOLUTION INTRAMUSCULAR; INTRAVENOUS ONCE
Status: COMPLETED | OUTPATIENT
Start: 2022-08-30 | End: 2022-08-30

## 2022-08-30 RX ORDER — DEXAMETHASONE SODIUM PHOSPHATE 4 MG/ML
12 INJECTION, SOLUTION INTRA-ARTICULAR; INTRALESIONAL; INTRAMUSCULAR; INTRAVENOUS; SOFT TISSUE ONCE
Status: COMPLETED | OUTPATIENT
Start: 2022-08-30 | End: 2022-08-30

## 2022-08-30 RX ADMIN — SODIUM CHLORIDE 1000 ML: 9 INJECTION, SOLUTION INTRAVENOUS at 10:08

## 2022-08-30 RX ADMIN — DEXAMETHASONE SODIUM PHOSPHATE 12 MG: 4 INJECTION, SOLUTION INTRAMUSCULAR; INTRAVENOUS at 09:08

## 2022-08-30 RX ADMIN — KETOROLAC TROMETHAMINE 15 MG: 30 INJECTION, SOLUTION INTRAMUSCULAR at 10:08

## 2022-08-30 NOTE — ED PROVIDER NOTES
Encounter Date: 8/30/2022       History     Chief Complaint   Patient presents with    Leg Pain     Chronic bilateral leg pain x 2 months, pt reports hx of gout     Clay Curtis is a 56 y/o AAM who presents to the Emergency Department POV with c/o acute gout flare up, patient is tender in his right hand as well as his right and left knee. Swelling as noted this those same extremities. Good pulse palpated in all extremities, patient has good ROM with moderate pain. Pain has been going on for the past three months, patient recently moved to the area and does not have a pcp.     The history is provided by the patient.   Leg Pain   There was no injury mechanism. The incident occurred several weeks ago. The pain is present in the left knee and right knee. The quality of the pain is described as aching. The pain is at a severity of 8/10. The pain has been Constant since onset. Pertinent negatives include no numbness. He reports no foreign bodies present. The symptoms are aggravated by activity, bearing weight and palpation. He has tried nothing for the symptoms. The treatment provided no relief.   Review of patient's allergies indicates:  No Known Allergies  Past Medical History:   Diagnosis Date    Hypertension     Mixed hyperlipidemia      Past Surgical History:   Procedure Laterality Date    CARDIAC SURGERY       History reviewed. No pertinent family history.  Social History     Tobacco Use    Smoking status: Never    Smokeless tobacco: Current     Types: Snuff   Substance Use Topics    Alcohol use: Not Currently     Alcohol/week: 12.0 standard drinks     Types: 12 Cans of beer per week    Drug use: Not Currently     Review of Systems   Constitutional: Negative.    HENT: Negative.     Eyes: Negative.    Respiratory: Negative.     Cardiovascular: Negative.    Gastrointestinal: Negative.    Endocrine: Negative.    Genitourinary: Negative.    Musculoskeletal:  Positive for arthralgias, gait problem and joint swelling.    Skin: Negative.    Allergic/Immunologic: Negative.    Neurological:  Negative for dizziness, tremors, seizures, syncope, facial asymmetry, speech difficulty, weakness, light-headedness, numbness and headaches.   Hematological: Negative.    Psychiatric/Behavioral: Negative.       Physical Exam     Initial Vitals [08/30/22 0916]   BP Pulse Resp Temp SpO2   123/77 98 18 98.2 °F (36.8 °C) 100 %      MAP       --         Physical Exam    Constitutional: Vital signs are normal. He appears well-developed and well-nourished. He is not diaphoretic. He is cooperative.  Non-toxic appearance. He does not have a sickly appearance. He appears ill. No distress.       HENT:   Head: Normocephalic and atraumatic.   Right Ear: Hearing, tympanic membrane, external ear and ear canal normal.   Left Ear: Hearing, tympanic membrane, external ear and ear canal normal.   Nose: Nose normal.   Mouth/Throat: Uvula is midline, oropharynx is clear and moist and mucous membranes are normal.   Eyes: Conjunctivae, EOM and lids are normal. Pupils are equal, round, and reactive to light.   Neck: Trachea normal and phonation normal. Neck supple. No thyroid mass and no thyromegaly present. No Brudzinski's sign and no Kernig's sign noted. Carotid bruit is not present.   Normal range of motion.  Cardiovascular:  Normal rate, regular rhythm, S1 normal, S2 normal, normal heart sounds, intact distal pulses and normal pulses.     Exam reveals no friction rub.       No murmur heard.  Pulmonary/Chest: Effort normal and breath sounds normal. He has no decreased breath sounds. He has no wheezes. He has no rhonchi. He has no rales.   Musculoskeletal:      Right upper arm: Swelling present.        Arms:       Cervical back: Normal range of motion and neck supple.      Right knee: Swelling present. Tenderness present.      Left knee: Swelling present. Tenderness present.        Legs:      Lymphadenopathy:     He has no cervical adenopathy.     He has no axillary  adenopathy.   Neurological: He is alert and oriented to person, place, and time. He has normal strength and normal reflexes. No cranial nerve deficit or sensory deficit. He displays a negative Romberg sign. GCS eye subscore is 4. GCS verbal subscore is 5. GCS motor subscore is 6.   Skin: Skin is warm, dry and intact. Capillary refill takes less than 2 seconds.       Medical Screening Exam   See Full Note    ED Course   Procedures  Labs Reviewed   COMPREHENSIVE METABOLIC PANEL - Abnormal; Notable for the following components:       Result Value    Sodium 133 (*)     Chloride 97 (*)     Glucose 114 (*)     BUN 27 (*)     Creatinine 1.68 (*)     Albumin 2.3 (*)     Globulin 5.8 (*)     ALT 12 (*)     AST 14 (*)     eGFR 47 (*)     All other components within normal limits   URIC ACID - Abnormal; Notable for the following components:    Uric Acid 8.4 (*)     All other components within normal limits   CBC WITH DIFFERENTIAL - Abnormal; Notable for the following components:    RBC 3.82 (*)     Hemoglobin 10.1 (*)     Hematocrit 30.8 (*)     MCH 26.4 (*)     RDW 14.6 (*)     Lymphocytes % 25.3 (*)     Monocytes % 13.1 (*)     Eosinophils % 0.6 (*)     Monocytes, Absolute 0.81 (*)     All other components within normal limits   CBC W/ AUTO DIFFERENTIAL    Narrative:     The following orders were created for panel order CBC auto differential.  Procedure                               Abnormality         Status                     ---------                               -----------         ------                     CBC with Differential[[id number]]        Abnormal            Final result               Manual Differential[[id number]]                              Final result                 Please view results for these tests on the individual orders.   MANUAL DIFFERENTIAL          Imaging Results    None          Medications   sodium chloride 0.9% bolus 1,000 mL (1,000 mLs Intravenous New Bag 8/30/22 1024)   dexamethasone  injection 12 mg (12 mg Intravenous Given 8/30/22 0957)   ketorolac injection 15 mg (15 mg Intravenous Given 8/30/22 1024)                 ED Course as of 08/30/22 1039   Tue Aug 30, 2022   1016 Lab results reviewed and discussed with patient, will give one liter fluid bolus, discharge instructions given, follow up appointment made for patient to see Dmitri Bah Friday at 11am at [facility]. Strict return precautions given, patient verbalizes understanding.  [AC]      ED Course User Index  [AC] ROYER Samayoa          Clinical Impression:   Final diagnoses:  [M10.9] Gouty arthritis of both knees (Primary)  [M10.9] Acute gout of right hand, unspecified cause      ED Disposition Condition    Discharge Stable          ED Prescriptions       Medication Sig Dispense Start Date End Date Auth. Provider    colchicine (COLCRYS) 0.6 mg tablet Take 1 tablet (0.6 mg total) by mouth 2 (two) times daily. For acute gout flare ups take 1.2 mg by mouth then 0.6mg one hour later, then 0.6 mg daily for 4 days and stop. 60 tablet 8/30/2022 9/29/2022 ROYER Samayoa    meloxicam (MOBIC) 7.5 MG tablet Take 1 tablet (7.5 mg total) by mouth once daily. 30 tablet 8/30/2022 -- ROYER Samayoa    allopurinoL (ZYLOPRIM) 100 MG tablet Take 1 tablet (100 mg total) by mouth once daily. 30 tablet 8/30/2022 -- ROYER Samayoa    methylPREDNISolone (MEDROL DOSEPACK) 4 mg tablet Take as directed 1 each 8/30/2022 9/20/2022 ROYER Samayoa          Follow-up Information    None          ROYER Samayoa  08/30/22 1037       ROYER Samayoa  08/30/22 1039

## 2022-08-30 NOTE — DISCHARGE INSTRUCTIONS
Take medication as directed by the label on the bottle, avoid all other NSAIDS while on this medication, drink plenty of water, avoid a diet that is high in purines, follow up with Mrs. Munoz or Dr. Olvin JOHNSON, return to the ER for any acute shortness of breath and or chest pain.

## 2022-09-02 ENCOUNTER — OFFICE VISIT (OUTPATIENT)
Dept: FAMILY MEDICINE | Facility: CLINIC | Age: 57
End: 2022-09-02
Payer: MEDICAID

## 2022-09-02 VITALS
BODY MASS INDEX: 28.32 KG/M2 | HEART RATE: 80 BPM | DIASTOLIC BLOOD PRESSURE: 81 MMHG | SYSTOLIC BLOOD PRESSURE: 121 MMHG | HEIGHT: 65 IN | TEMPERATURE: 98 F | WEIGHT: 170 LBS | OXYGEN SATURATION: 98 %

## 2022-09-02 DIAGNOSIS — N17.9 AKI (ACUTE KIDNEY INJURY): Primary | ICD-10-CM

## 2022-09-02 DIAGNOSIS — D64.9 ANEMIA, UNSPECIFIED TYPE: ICD-10-CM

## 2022-09-02 DIAGNOSIS — M13.80 OLIGOARTHRITIS: ICD-10-CM

## 2022-09-02 DIAGNOSIS — I10 HYPERTENSION, UNSPECIFIED TYPE: ICD-10-CM

## 2022-09-02 LAB
ANION GAP SERPL CALCULATED.3IONS-SCNC: 12 MMOL/L (ref 7–16)
BUN SERPL-MCNC: 36 MG/DL (ref 7–18)
BUN/CREAT SERPL: 25 (ref 6–20)
CALCIUM SERPL-MCNC: 9.4 MG/DL (ref 8.5–10.1)
CHLORIDE SERPL-SCNC: 107 MMOL/L (ref 98–107)
CO2 SERPL-SCNC: 25 MMOL/L (ref 21–32)
CREAT SERPL-MCNC: 1.46 MG/DL (ref 0.7–1.3)
EGFR (NO RACE VARIABLE) (RUSH/TITUS): 56 ML/MIN/1.73M²
GLUCOSE SERPL-MCNC: 99 MG/DL (ref 74–106)
POTASSIUM SERPL-SCNC: 4.9 MMOL/L (ref 3.5–5.1)
SODIUM SERPL-SCNC: 139 MMOL/L (ref 136–145)

## 2022-09-02 PROCEDURE — 99203 OFFICE O/P NEW LOW 30 MIN: CPT | Mod: ,,, | Performed by: STUDENT IN AN ORGANIZED HEALTH CARE EDUCATION/TRAINING PROGRAM

## 2022-09-02 PROCEDURE — 80048 BASIC METABOLIC PNL TOTAL CA: CPT | Mod: ,,, | Performed by: CLINICAL MEDICAL LABORATORY

## 2022-09-02 PROCEDURE — 99203 PR OFFICE/OUTPT VISIT, NEW, LEVL III, 30-44 MIN: ICD-10-PCS | Mod: ,,, | Performed by: STUDENT IN AN ORGANIZED HEALTH CARE EDUCATION/TRAINING PROGRAM

## 2022-09-02 PROCEDURE — 80048 BASIC METABOLIC PANEL: ICD-10-PCS | Mod: ,,, | Performed by: CLINICAL MEDICAL LABORATORY

## 2022-09-02 NOTE — PROGRESS NOTES
"Subjective:       Patient ID: Clay Curtis is a 57 y.o. male.    Chief Complaint: Establish Care    HPI    Mr. Curtis is a 57-year-old man with the medical problems listed below who comes to clinic after recently being treated in the ED as a gout flare. He has never had a gout flare.    In the ED was noted to have ROX; he has been maintaining adequate po intake.     Regarding this problem, just prior to the ED visit, he began have acute oligoarthritis  that started with his L foot, bilateral knees, and progressed to right hand tenosynovitis. He did not have arthrocentesis or plain films.     Overall, his symptoms have progressively improved since his ED visit. He does continue to have some mild swelling in his R hand.    He denies any oliguria, dysuria, chest pain, dyspnea, or pleuritic symptoms.    Review of Systems      Objective:    /81   Pulse 80   Temp 97.8 °F (36.6 °C) (Oral)   Ht 5' 5" (1.651 m)   Wt 77.1 kg (170 lb)   SpO2 98%   BMI 28.29 kg/m²       Physical Exam    Gen: well-groomed, well-dressed. No apparent distress  HEENT:  NCAT, symmetric  Pulm: normal work of breathing, no accessory muscle use; speaking complete sentences; rare dry cough  Neuro: CN II-XII grossly normal   MSK: diffuse tenosynovitis of right hand   Psych: pleasant affect, congruent mood. Linear thought; good eye contact  SKIN: no rashes present on face, neck, hands  Assessment / Plan:       Problem List Items Addressed This Visit          Cardiac/Vascular    Hypertension     Within goal; not insured; would prefer to wait for refill of antihypertensive medication until next visit. Monitor BP at home.             Renal/    ROX (acute kidney injury) - Primary     Repeating BMP to assess for resolution         Relevant Orders    Basic Metabolic Panel (Completed)       Orthopedic    Oligoarthritis     No clear inciting factor; on steroid taper and received colchicine ppx; also given prescription for allopurinol to start " after flare resolves. Will recheck serum urate level (8.4 initially) in several weeks. Should obtain plain films, will consider additional labs if symptoms persist.

## 2022-09-07 PROBLEM — M13.80 OLIGOARTHRITIS: Status: ACTIVE | Noted: 2022-09-07

## 2022-09-07 PROBLEM — I10 HYPERTENSION: Status: ACTIVE | Noted: 2022-09-07

## 2022-09-07 PROBLEM — N17.9 AKI (ACUTE KIDNEY INJURY): Status: ACTIVE | Noted: 2022-09-07

## 2022-09-07 NOTE — ASSESSMENT & PLAN NOTE
No clear inciting factor; on steroid taper and received colchicine ppx; also given prescription for allopurinol to start after flare resolves. Will recheck serum urate level (8.4 initially) in several weeks. Should obtain plain films, will consider additional labs if symptoms persist.

## 2022-09-07 NOTE — ASSESSMENT & PLAN NOTE
Within goal; not insured; would prefer to wait for refill of antihypertensive medication until next visit. Monitor BP at home.

## 2022-09-14 ENCOUNTER — TELEPHONE (OUTPATIENT)
Dept: FAMILY MEDICINE | Facility: CLINIC | Age: 57
End: 2022-09-14

## 2022-09-14 NOTE — TELEPHONE ENCOUNTER
----- Message from Dmitri Stover MD sent at 9/13/2022  4:54 PM CDT -----  Please let Mr. Curtis know his labs look somewhat improved from 2 weeks ago, but he still has other labs he needs to complete.

## 2022-09-27 ENCOUNTER — OFFICE VISIT (OUTPATIENT)
Dept: FAMILY MEDICINE | Facility: CLINIC | Age: 57
End: 2022-09-27

## 2022-09-27 VITALS
TEMPERATURE: 98 F | OXYGEN SATURATION: 98 % | DIASTOLIC BLOOD PRESSURE: 63 MMHG | BODY MASS INDEX: 28.49 KG/M2 | HEIGHT: 65 IN | SYSTOLIC BLOOD PRESSURE: 98 MMHG | HEART RATE: 102 BPM | WEIGHT: 171 LBS

## 2022-09-27 DIAGNOSIS — N17.9 AKI (ACUTE KIDNEY INJURY): ICD-10-CM

## 2022-09-27 DIAGNOSIS — M13.80 OLIGOARTHRITIS: Primary | ICD-10-CM

## 2022-09-27 DIAGNOSIS — I10 HYPERTENSION, UNSPECIFIED TYPE: ICD-10-CM

## 2022-09-27 DIAGNOSIS — J02.9 SORE THROAT: ICD-10-CM

## 2022-09-27 DIAGNOSIS — D64.9 ANEMIA, UNSPECIFIED TYPE: ICD-10-CM

## 2022-09-27 LAB
CTP QC/QA: YES
S PYO RRNA THROAT QL PROBE: NEGATIVE

## 2022-09-27 PROCEDURE — 82728 ASSAY OF FERRITIN: CPT | Mod: ,,, | Performed by: CLINICAL MEDICAL LABORATORY

## 2022-09-27 PROCEDURE — 83540 ASSAY OF IRON: CPT | Mod: ,,, | Performed by: CLINICAL MEDICAL LABORATORY

## 2022-09-27 PROCEDURE — 84466 ASSAY OF TRANSFERRIN: CPT | Mod: ,,, | Performed by: CLINICAL MEDICAL LABORATORY

## 2022-09-27 PROCEDURE — 36415 PR COLLECTION VENOUS BLOOD,VENIPUNCTURE: ICD-10-PCS | Mod: ,,, | Performed by: CLINICAL MEDICAL LABORATORY

## 2022-09-27 PROCEDURE — 86038 ANA EIA W/REFLEX DSDNA/ENA: ICD-10-PCS | Mod: ,,, | Performed by: CLINICAL MEDICAL LABORATORY

## 2022-09-27 PROCEDURE — 86225 ANTI-DNA ANTIBODY, DOUBLE-STRANDED: ICD-10-PCS | Mod: ,,, | Performed by: CLINICAL MEDICAL LABORATORY

## 2022-09-27 PROCEDURE — 86060 ANTISTREPTOLYSIN O TITER: ICD-10-PCS | Mod: ,,, | Performed by: CLINICAL MEDICAL LABORATORY

## 2022-09-27 PROCEDURE — 82728 FERRITIN: ICD-10-PCS | Mod: ,,, | Performed by: CLINICAL MEDICAL LABORATORY

## 2022-09-27 PROCEDURE — 86200 CCP ANTIBODY: CPT | Mod: ,,, | Performed by: CLINICAL MEDICAL LABORATORY

## 2022-09-27 PROCEDURE — 86060 ANTISTREPTOLYSIN O TITER: CPT | Mod: ,,, | Performed by: CLINICAL MEDICAL LABORATORY

## 2022-09-27 PROCEDURE — 86038 ANTINUCLEAR ANTIBODIES: CPT | Mod: ,,, | Performed by: CLINICAL MEDICAL LABORATORY

## 2022-09-27 PROCEDURE — 86235 PR  NUCLEAR ANTIGEN ANTIBODY: ICD-10-PCS | Mod: 90,XU,, | Performed by: CLINICAL MEDICAL LABORATORY

## 2022-09-27 PROCEDURE — 83540 IRON AND TIBC: ICD-10-PCS | Mod: ,,, | Performed by: CLINICAL MEDICAL LABORATORY

## 2022-09-27 PROCEDURE — 99215 PR OFFICE/OUTPT VISIT, EST, LEVL V, 40-54 MIN: ICD-10-PCS | Mod: ,,, | Performed by: STUDENT IN AN ORGANIZED HEALTH CARE EDUCATION/TRAINING PROGRAM

## 2022-09-27 PROCEDURE — 99215 OFFICE O/P EST HI 40 MIN: CPT | Mod: ,,, | Performed by: STUDENT IN AN ORGANIZED HEALTH CARE EDUCATION/TRAINING PROGRAM

## 2022-09-27 PROCEDURE — 99417 PR PROLONGED SVC, OUTPT, W/WO DIRECT PT CONTACT,  EA ADDTL 15 MIN: ICD-10-PCS | Mod: ,,, | Performed by: STUDENT IN AN ORGANIZED HEALTH CARE EDUCATION/TRAINING PROGRAM

## 2022-09-27 PROCEDURE — 80048 BASIC METABOLIC PANEL: ICD-10-PCS | Mod: ,,, | Performed by: CLINICAL MEDICAL LABORATORY

## 2022-09-27 PROCEDURE — 86235 NUCLEAR ANTIGEN ANTIBODY: CPT | Mod: 90,XU,, | Performed by: CLINICAL MEDICAL LABORATORY

## 2022-09-27 PROCEDURE — 83550 IRON BINDING TEST: CPT | Mod: ,,, | Performed by: CLINICAL MEDICAL LABORATORY

## 2022-09-27 PROCEDURE — 85025 CBC WITH DIFFERENTIAL: ICD-10-PCS | Mod: ,,, | Performed by: CLINICAL MEDICAL LABORATORY

## 2022-09-27 PROCEDURE — 99417 PROLNG OP E/M EACH 15 MIN: CPT | Mod: ,,, | Performed by: STUDENT IN AN ORGANIZED HEALTH CARE EDUCATION/TRAINING PROGRAM

## 2022-09-27 PROCEDURE — 85651 SEDIMENTATION RATE, AUTOMATED: ICD-10-PCS | Mod: ,,, | Performed by: CLINICAL MEDICAL LABORATORY

## 2022-09-27 PROCEDURE — 86225 DNA ANTIBODY NATIVE: CPT | Mod: ,,, | Performed by: CLINICAL MEDICAL LABORATORY

## 2022-09-27 PROCEDURE — 84466 TRANSFERRIN: ICD-10-PCS | Mod: ,,, | Performed by: CLINICAL MEDICAL LABORATORY

## 2022-09-27 PROCEDURE — 80048 BASIC METABOLIC PNL TOTAL CA: CPT | Mod: ,,, | Performed by: CLINICAL MEDICAL LABORATORY

## 2022-09-27 PROCEDURE — 86235 NUCLEAR ANTIGEN ANTIBODY: CPT | Mod: ,,, | Performed by: CLINICAL MEDICAL LABORATORY

## 2022-09-27 PROCEDURE — 86140 C-REACTIVE PROTEIN: ICD-10-PCS | Mod: ,,, | Performed by: CLINICAL MEDICAL LABORATORY

## 2022-09-27 PROCEDURE — 36415 COLL VENOUS BLD VENIPUNCTURE: CPT | Mod: ,,, | Performed by: CLINICAL MEDICAL LABORATORY

## 2022-09-27 PROCEDURE — 85045 AUTOMATED RETICULOCYTE COUNT: CPT | Mod: ,,, | Performed by: CLINICAL MEDICAL LABORATORY

## 2022-09-27 PROCEDURE — 85651 RBC SED RATE NONAUTOMATED: CPT | Mod: ,,, | Performed by: CLINICAL MEDICAL LABORATORY

## 2022-09-27 PROCEDURE — 86140 C-REACTIVE PROTEIN: CPT | Mod: ,,, | Performed by: CLINICAL MEDICAL LABORATORY

## 2022-09-27 PROCEDURE — 85045 RETICULOCYTES: ICD-10-PCS | Mod: ,,, | Performed by: CLINICAL MEDICAL LABORATORY

## 2022-09-27 PROCEDURE — 87880 POCT RAPID STREP A: ICD-10-PCS | Mod: QW,,, | Performed by: STUDENT IN AN ORGANIZED HEALTH CARE EDUCATION/TRAINING PROGRAM

## 2022-09-27 PROCEDURE — 87880 STREP A ASSAY W/OPTIC: CPT | Mod: QW,,, | Performed by: STUDENT IN AN ORGANIZED HEALTH CARE EDUCATION/TRAINING PROGRAM

## 2022-09-27 PROCEDURE — 85025 COMPLETE CBC W/AUTO DIFF WBC: CPT | Mod: ,,, | Performed by: CLINICAL MEDICAL LABORATORY

## 2022-09-27 PROCEDURE — 83550 IRON AND TIBC: ICD-10-PCS | Mod: ,,, | Performed by: CLINICAL MEDICAL LABORATORY

## 2022-09-27 PROCEDURE — 86200 CYCLIC CITRUL PEPTIDE ANTIBODY, IGG: ICD-10-PCS | Mod: ,,, | Performed by: CLINICAL MEDICAL LABORATORY

## 2022-09-27 RX ORDER — PREDNISONE 10 MG/1
TABLET ORAL
Qty: 75 TABLET | Refills: 0 | Status: SHIPPED | OUTPATIENT
Start: 2022-09-27 | End: 2022-10-21

## 2022-09-28 LAB
ANION GAP SERPL CALCULATED.3IONS-SCNC: 11 MMOL/L (ref 7–16)
ASO AB SERPL-ACNC: 99 IU/ML (ref 0–408)
BASOPHILS # BLD AUTO: 0.03 K/UL (ref 0–0.2)
BASOPHILS NFR BLD AUTO: 0.5 % (ref 0–1)
BUN SERPL-MCNC: 26 MG/DL (ref 7–18)
BUN/CREAT SERPL: 16 (ref 6–20)
CALCIUM SERPL-MCNC: 9.5 MG/DL (ref 8.5–10.1)
CHLORIDE SERPL-SCNC: 101 MMOL/L (ref 98–107)
CO2 SERPL-SCNC: 28 MMOL/L (ref 21–32)
CREAT SERPL-MCNC: 1.67 MG/DL (ref 0.7–1.3)
CRP SERPL-MCNC: 16.4 MG/DL (ref 0–0.8)
DIFFERENTIAL METHOD BLD: ABNORMAL
EGFR (NO RACE VARIABLE) (RUSH/TITUS): 47 ML/MIN/1.73M²
EOSINOPHIL # BLD AUTO: 0.07 K/UL (ref 0–0.5)
EOSINOPHIL NFR BLD AUTO: 1.2 % (ref 1–4)
ERYTHROCYTE [DISTWIDTH] IN BLOOD BY AUTOMATED COUNT: 16.7 % (ref 11.5–14.5)
ERYTHROCYTE [SEDIMENTATION RATE] IN BLOOD BY WESTERGREN METHOD: 150 MM/HR (ref 0–20)
FERRITIN SERPL-MCNC: 780 NG/ML (ref 26–388)
GLUCOSE SERPL-MCNC: 118 MG/DL (ref 74–106)
HCT VFR BLD AUTO: 32 % (ref 40–54)
HGB BLD-MCNC: 10.1 G/DL (ref 13.5–18)
IMM GRANULOCYTES # BLD AUTO: 0.1 K/UL (ref 0–0.04)
IMM GRANULOCYTES NFR BLD: 1.7 % (ref 0–0.4)
IMM RETICS NFR: 17.7 % (ref 2.3–13.4)
IRON SATN MFR SERPL: 6 % (ref 14–50)
IRON SERPL-MCNC: 12 ΜG/DL (ref 65–175)
LYMPHOCYTES # BLD AUTO: 1.39 K/UL (ref 1–4.8)
LYMPHOCYTES NFR BLD AUTO: 24 % (ref 27–41)
MCH RBC QN AUTO: 25.6 PG (ref 27–31)
MCHC RBC AUTO-ENTMCNC: 31.6 G/DL (ref 32–36)
MCV RBC AUTO: 81 FL (ref 80–96)
MONOCYTES # BLD AUTO: 0.62 K/UL (ref 0–0.8)
MONOCYTES NFR BLD AUTO: 10.7 % (ref 2–6)
MPC BLD CALC-MCNC: 10.9 FL (ref 9.4–12.4)
NEUTROPHILS # BLD AUTO: 3.59 K/UL (ref 1.8–7.7)
NEUTROPHILS NFR BLD AUTO: 61.9 % (ref 53–65)
NRBC # BLD AUTO: 0 X10E3/UL
NRBC, AUTO (.00): 0 %
PLATELET # BLD AUTO: 519 K/UL (ref 150–400)
POTASSIUM SERPL-SCNC: 4 MMOL/L (ref 3.5–5.1)
RBC # BLD AUTO: 3.95 M/UL (ref 4.6–6.2)
RETICS # AUTO: 0.04 X10E6/UL (ref 0.02–0.11)
RETICS/RBC NFR AUTO: 1.1 % (ref 0.4–2.2)
SODIUM SERPL-SCNC: 136 MMOL/L (ref 136–145)
TIBC SERPL-MCNC: 192 ΜG/DL (ref 250–450)
TRANSFERRIN SERPL-MCNC: 167 MG/DL (ref 200–360)
WBC # BLD AUTO: 5.8 K/UL (ref 4.5–11)

## 2022-09-28 NOTE — ASSESSMENT & PLAN NOTE
Patient presenting with greater number of joint involvement at this visit.  Methylprednisolone seem to help.  Will place on higher dose of steroids with longer taper.  Checking for evidence of inflammation with ESR, CRP.  Will screen for rheumatoid arthritis with rheumatoid factor (confirm with CCP if positive), SLE with JOHANNE, acute rheumatic fever with ASO antibodies, and rapid strep test/throat culture.  The presence of multiple large joints being involved, not responsive to allopurinol and adequate treatment with colchicine/steroids is unusual for gout.  His last uric acid was elevated to 8.6 however.  Given this unusual presentation will assess for other causes, continue steroids, and requested urgent referral to Rheumatology for arthrocentesis.  Despite the presence of multiple joint swelling, he is overall not ill-appearing. He has not had fever, and he does not have heart murmur, but my suspicion for septic arthritis/systemic complications of prior bacteria is higher in this man with history of illicit drug use.  Advised him to go to emergency department immediately if he begins to feel weak, lightheaded, or dizzy as this may represent severe infection that would require intensive and aggressive treatment.

## 2022-09-28 NOTE — ASSESSMENT & PLAN NOTE
Has been taking lisinopril and hydrochlorothiazide.  He does appear ill and in pain; his blood pressure is on the lower side however he does not have any orthostatic symptoms.  Suspect this is related to slight volume depletion.  Recommended stopping hydrochlorothiazide and monitoring his blood pressure at home.  Given that we are starting steroids, will continue the ACE-inhibitor for now as I expect his blood pressure to rise.  He does not current weight on a blood pressure cuff, however he does have 1 from a family member he can use.  Counseled him on warning signs for hypertension that would require urgent evaluation.

## 2022-09-28 NOTE — PROGRESS NOTES
"Subjective:       Patient ID: Clay Curtis is a 57 y.o. male.    Chief Complaint: Gout (Hands and knees)    HPI    57-year-old man with medical problems listed below who comes to clinic for follow-up of oligo arthritis.  He was previously diagnosed with gout in the emergency department.  As previously mentioned, he has never had arthrocentesis to confirm the diagnosis.  He presents to clinic with acute worsening of his arthralgia and tenosynovitis.  He is having bilateral hand and wrist and finger pain.  He also has bilateral knee pain, elbow pain, and stiffness that improves with movement.  He is having difficulty walking.  He denies any chest pain, shortness of breath, or cough, however he does report a sore throat that he has had for several weeks.  He denies any presyncopal episodes, dizziness, or lightheadedness.    He does note that he is detoxing from "ice." He denies any other illicit drug use or history of injection drug use.    He has not had any gross hematuria.  He does state his symptoms improved with methylprednisolone, however once he discontinued the steroid his symptoms returned.    He is taking meloxicam every day.      He is no longer taking colchicine.      He is taking allopurinol every day.      He is taking hydrochlorothiazide every day.      He is taking lisinopril every day.      Review of Systems      Objective:    BP 98/63   Pulse 102   Temp 97.8 °F (36.6 °C)   Ht 5' 5" (1.651 m)   Wt 77.6 kg (171 lb)   SpO2 98%   BMI 28.46 kg/m²     Physical Exam    Gen: well-groomed, well-dressed. No apparent distress  HEENT:  NCAT, symmetric  Pulm: normal work of breathing, no accessory muscle use; speaking complete sentences; rare dry cough  Neuro: CN II-XII grossly normal   MSK: diffuse tenosynovitis of right and left pain; R elbow ttp; b/l knees tender to palpation  Psych: pleasant affect, congruent mood. Linear thought; good eye contact  SKIN: no rashes present on face, neck, " hands  Assessment:       Problem List Items Addressed This Visit          ENT    Sore throat    Relevant Orders    POCT rapid strep A (Completed)    Antistreptolysin O (ASO)       Cardiac/Vascular    Hypertension     Has been taking lisinopril and hydrochlorothiazide.  He does appear ill and in pain; his blood pressure is on the lower side however he does not have any orthostatic symptoms.  Suspect this is related to slight volume depletion.  Recommended stopping hydrochlorothiazide and monitoring his blood pressure at home.  Given that we are starting steroids, will continue the ACE-inhibitor for now as I expect his blood pressure to rise.  He does not current weight on a blood pressure cuff, however he does have 1 from a family member he can use.  Counseled him on warning signs for hypertension that would require urgent evaluation.            Renal/    ROX (acute kidney injury)       Orthopedic    Oligoarthritis - Primary     Patient presenting with greater number of joint involvement at this visit.  Methylprednisolone seem to help.  Will place on higher dose of steroids with longer taper.  Checking for evidence of inflammation with ESR, CRP.  Will screen for rheumatoid arthritis with rheumatoid factor (confirm with CCP if positive), SLE with JOHANNE, acute rheumatic fever with ASO antibodies, and rapid strep test/throat culture.  The presence of multiple large joints being involved, not responsive to allopurinol and adequate treatment with colchicine/steroids is unusual for gout.  His last uric acid was elevated to 8.6 however.  Given this unusual presentation will assess for other causes, continue steroids, and requested urgent referral to Rheumatology for arthrocentesis.  Despite the presence of multiple joint swelling, he is overall not ill-appearing. He has not had fever, and he does not have heart murmur, but my suspicion for septic arthritis/systemic complications of prior bacteria is higher in this man with  history of illicit drug use.  Advised him to go to emergency department immediately if he begins to feel weak, lightheaded, or dizzy as this may represent severe infection that would require intensive and aggressive treatment.         Relevant Medications    predniSONE (DELTASONE) 10 MG tablet    Other Relevant Orders    CBC Auto Differential    Basic Metabolic Panel    Sedimentation Rate    C-Reactive Protein    Ambulatory referral/consult to Rheumatology     Other Visit Diagnoses       Anemia, unspecified type        Relevant Orders    Reticulocytes           RTC 2 weeks    Face to face encounter time 30 minutes.    Non face to face encounter time 50 minutes.  Reviewing separate obtained history, counseling and educating the patient, family and/or other caregivers, ordering medications, tests or procedures; referring and communicating with other health care professionals; documenting clinical information in the electronic medical record; care coordination; independently interpreting results and communicating results to the patient, family, and/or caregivers.    Total time for visit  80 minutes

## 2022-09-29 LAB
ANA SER QL: POSITIVE
CCP AB SER IA-ACNC: >250 UNITS
DSDNA IGG SERPL IA-ACNC: 37 IU (ref 0–24)
DSDNA IGG SERPL IA-ACNC: ABNORMAL [IU]/ML
ENA AB SER QL IA: 53 EUS (ref 0–19.9)
ENA AB SER QL IA: POSITIVE

## 2022-10-01 LAB
ENA JO1 IGG SER-ACNC: <0.2 U
ENA RNP IGG SER IA-ACNC: 1.1 U
ENA SCL70 IGG SER IA-ACNC: <0.2 U
ENA SM IGG SER-ACNC: <0.2 U
ENA SS-A IGG SER-ACNC: 4.8 U
ENA SS-B IGG SER-ACNC: <0.2 U

## 2022-10-10 ENCOUNTER — TELEPHONE (OUTPATIENT)
Dept: FAMILY MEDICINE | Facility: CLINIC | Age: 57
End: 2022-10-10
Payer: MEDICAID

## 2022-10-10 NOTE — TELEPHONE ENCOUNTER
Called patient over the phone who reports dramatic improvement in joint symptoms since starting prednisone and is continuing his taper. We discussed labs concerning for rheumatoid arthritis including some of the possible treatments, including MTX. He has not heard from [facility] rheumatology yet; referral clerk let me know that this is normally a lengthy process when a patient is uninsured. He has follow-up with me in several weeks. Will continue workup at that time.

## 2022-10-21 ENCOUNTER — APPOINTMENT (OUTPATIENT)
Dept: RADIOLOGY | Facility: CLINIC | Age: 57
End: 2022-10-21
Attending: STUDENT IN AN ORGANIZED HEALTH CARE EDUCATION/TRAINING PROGRAM
Payer: MEDICAID

## 2022-10-21 ENCOUNTER — OFFICE VISIT (OUTPATIENT)
Dept: FAMILY MEDICINE | Facility: CLINIC | Age: 57
End: 2022-10-21
Payer: MEDICAID

## 2022-10-21 VITALS
HEIGHT: 65 IN | HEART RATE: 70 BPM | SYSTOLIC BLOOD PRESSURE: 179 MMHG | TEMPERATURE: 98 F | WEIGHT: 189 LBS | BODY MASS INDEX: 31.49 KG/M2 | OXYGEN SATURATION: 99 % | DIASTOLIC BLOOD PRESSURE: 90 MMHG

## 2022-10-21 DIAGNOSIS — Z23 INFLUENZA VACCINE NEEDED: ICD-10-CM

## 2022-10-21 DIAGNOSIS — I10 HYPERTENSION, UNSPECIFIED TYPE: ICD-10-CM

## 2022-10-21 DIAGNOSIS — M06.9 RHEUMATOID ARTHRITIS, INVOLVING UNSPECIFIED SITE, UNSPECIFIED WHETHER RHEUMATOID FACTOR PRESENT: Primary | ICD-10-CM

## 2022-10-21 DIAGNOSIS — I01.1 RHEUMATOID AORTITIS: ICD-10-CM

## 2022-10-21 LAB
HBV SURFACE AG SERPL QL IA: NORMAL
HCV AB SER QL: NORMAL

## 2022-10-21 PROCEDURE — 86803 HEPATITIS C ANTIBODY: ICD-10-PCS | Mod: ,,, | Performed by: CLINICAL MEDICAL LABORATORY

## 2022-10-21 PROCEDURE — 1159F MED LIST DOCD IN RCRD: CPT | Mod: CPTII,,, | Performed by: STUDENT IN AN ORGANIZED HEALTH CARE EDUCATION/TRAINING PROGRAM

## 2022-10-21 PROCEDURE — 3080F DIAST BP >= 90 MM HG: CPT | Mod: CPTII,,, | Performed by: STUDENT IN AN ORGANIZED HEALTH CARE EDUCATION/TRAINING PROGRAM

## 2022-10-21 PROCEDURE — 86704 HEPATITIS B CORE ANTIBODY, TOTAL: ICD-10-PCS | Mod: 90,,, | Performed by: CLINICAL MEDICAL LABORATORY

## 2022-10-21 PROCEDURE — 1160F PR REVIEW ALL MEDS BY PRESCRIBER/CLIN PHARMACIST DOCUMENTED: ICD-10-PCS | Mod: CPTII,,, | Performed by: STUDENT IN AN ORGANIZED HEALTH CARE EDUCATION/TRAINING PROGRAM

## 2022-10-21 PROCEDURE — 90471 FLU VACCINE (QUAD) GREATER THAN OR EQUAL TO 3YO PRESERVATIVE FREE IM: ICD-10-PCS | Mod: ,,, | Performed by: STUDENT IN AN ORGANIZED HEALTH CARE EDUCATION/TRAINING PROGRAM

## 2022-10-21 PROCEDURE — 86803 HEPATITIS C AB TEST: CPT | Mod: ,,, | Performed by: CLINICAL MEDICAL LABORATORY

## 2022-10-21 PROCEDURE — 99417 PROLNG OP E/M EACH 15 MIN: CPT | Mod: ,,, | Performed by: STUDENT IN AN ORGANIZED HEALTH CARE EDUCATION/TRAINING PROGRAM

## 2022-10-21 PROCEDURE — 1159F PR MEDICATION LIST DOCUMENTED IN MEDICAL RECORD: ICD-10-PCS | Mod: CPTII,,, | Performed by: STUDENT IN AN ORGANIZED HEALTH CARE EDUCATION/TRAINING PROGRAM

## 2022-10-21 PROCEDURE — 99417 PR PROLONGED SVC, OUTPT, W/WO DIRECT PT CONTACT,  EA ADDTL 15 MIN: ICD-10-PCS | Mod: ,,, | Performed by: STUDENT IN AN ORGANIZED HEALTH CARE EDUCATION/TRAINING PROGRAM

## 2022-10-21 PROCEDURE — 3077F PR MOST RECENT SYSTOLIC BLOOD PRESSURE >= 140 MM HG: ICD-10-PCS | Mod: CPTII,,, | Performed by: STUDENT IN AN ORGANIZED HEALTH CARE EDUCATION/TRAINING PROGRAM

## 2022-10-21 PROCEDURE — 87340 HEPATITIS B SURFACE ANTIGEN: ICD-10-PCS | Mod: ,,, | Performed by: CLINICAL MEDICAL LABORATORY

## 2022-10-21 PROCEDURE — 73620 X-RAY EXAM OF FOOT: CPT | Mod: 50,TC,RHCUB,FY | Performed by: STUDENT IN AN ORGANIZED HEALTH CARE EDUCATION/TRAINING PROGRAM

## 2022-10-21 PROCEDURE — 3077F SYST BP >= 140 MM HG: CPT | Mod: CPTII,,, | Performed by: STUDENT IN AN ORGANIZED HEALTH CARE EDUCATION/TRAINING PROGRAM

## 2022-10-21 PROCEDURE — 99215 OFFICE O/P EST HI 40 MIN: CPT | Mod: 25,,, | Performed by: STUDENT IN AN ORGANIZED HEALTH CARE EDUCATION/TRAINING PROGRAM

## 2022-10-21 PROCEDURE — 90686 FLU VACCINE (QUAD) GREATER THAN OR EQUAL TO 3YO PRESERVATIVE FREE IM: ICD-10-PCS | Mod: ,,, | Performed by: STUDENT IN AN ORGANIZED HEALTH CARE EDUCATION/TRAINING PROGRAM

## 2022-10-21 PROCEDURE — 99215 PR OFFICE/OUTPT VISIT, EST, LEVL V, 40-54 MIN: ICD-10-PCS | Mod: 25,,, | Performed by: STUDENT IN AN ORGANIZED HEALTH CARE EDUCATION/TRAINING PROGRAM

## 2022-10-21 PROCEDURE — 90686 IIV4 VACC NO PRSV 0.5 ML IM: CPT | Mod: ,,, | Performed by: STUDENT IN AN ORGANIZED HEALTH CARE EDUCATION/TRAINING PROGRAM

## 2022-10-21 PROCEDURE — 86704 HEP B CORE ANTIBODY TOTAL: CPT | Mod: 90,,, | Performed by: CLINICAL MEDICAL LABORATORY

## 2022-10-21 PROCEDURE — 87340 HEPATITIS B SURFACE AG IA: CPT | Mod: ,,, | Performed by: CLINICAL MEDICAL LABORATORY

## 2022-10-21 PROCEDURE — 90471 IMMUNIZATION ADMIN: CPT | Mod: ,,, | Performed by: STUDENT IN AN ORGANIZED HEALTH CARE EDUCATION/TRAINING PROGRAM

## 2022-10-21 PROCEDURE — 3080F PR MOST RECENT DIASTOLIC BLOOD PRESSURE >= 90 MM HG: ICD-10-PCS | Mod: CPTII,,, | Performed by: STUDENT IN AN ORGANIZED HEALTH CARE EDUCATION/TRAINING PROGRAM

## 2022-10-21 PROCEDURE — 1160F RVW MEDS BY RX/DR IN RCRD: CPT | Mod: CPTII,,, | Performed by: STUDENT IN AN ORGANIZED HEALTH CARE EDUCATION/TRAINING PROGRAM

## 2022-10-21 RX ORDER — PREDNISONE 10 MG/1
TABLET ORAL
Qty: 18 TABLET | Refills: 0 | Status: SHIPPED | OUTPATIENT
Start: 2022-10-21 | End: 2022-10-28 | Stop reason: SDUPTHER

## 2022-10-21 RX ORDER — HYDROCHLOROTHIAZIDE 12.5 MG/1
12.5 TABLET ORAL DAILY
Qty: 30 TABLET | Refills: 1 | Status: SHIPPED | OUTPATIENT
Start: 2022-10-21 | End: 2022-12-05 | Stop reason: SDUPTHER

## 2022-10-21 NOTE — ASSESSMENT & PLAN NOTE
Newly diagnosed rheumatoid arthritis; b/l hand and wrist swelling, + RF and +CCP; responded favorably to initial prednisone taper, though he has had relapse and requires a repeat taper. Obtaining b/l hand, wrist, and feet XR today and checking hepatitis serology. He should start DMARD therapy ASAP. He just got his medicaid number, so will reach out to referral clerk to schedule rheumatology appt at [facility]. Counseled at length about RA and the importance of care by a rheumatologist to improve disease control and complications; he expressed understanding and agreement with this plan.

## 2022-10-21 NOTE — PROGRESS NOTES
"Subjective:       Patient ID: Clay Curtis is a 57 y.o. male.    Chief Complaint: Numbness (Robbie hand and feet numbness/pain/stiffness/)    HPI      57-year-old man with newly-diagnosed RA who comes in for follow-up.    He has been on a prednisone taper for several weeks and was initially doing better, but since tapering down to 5 mg of prednisone, his joint symptoms have worsened. He has bilateral hand swelling and foot pain. He is an avid yeny, and over the weekend he noticed worsening foot pain and felt like every step he was taking was painful. At that time, he took an Aleve from a friend and stated this was somewhat helpful.     He completely stopped taking lisinopril because he got on the Internet and read about potential side effects, which alarmed him.         Objective:    BP (!) 179/90   Pulse 70   Temp 97.6 °F (36.4 °C)   Ht 5' 5" (1.651 m)   Wt 85.7 kg (189 lb)   SpO2 99%   BMI 31.45 kg/m²     Physical Exam    Gen: well-groomed, well-dressed. No apparent distress  HEENT:  NCAT, symmetric  Pulm: normal work of breathing, no accessory muscle use; speaking complete sentences without having to stop  Neuro: CN II-XII grossly normal   MSK: bilateral hand swelling and tenosynovitis  Psych: pleasant affect, congruent mood. Linear thought; good eye contact  SKIN: no rashes present on face, neck, hands    Assessment / Plan:        Problem List Items Addressed This Visit          Cardiac/Vascular    Hypertension     Above goal; likely related to steroid use and non-adherence to lisinopril. Given his c/f adverse effects, will stop lisinopril. Start HCTZ 12.5 mg, which he was previously taking and tolerating. RTC in 4 weeks to re-assess BP control after repeat steroid taper. He was previously having lower BP readings, so suspect he may be able to achieve control on monotherapy. Follow closely and continue good home BP monitoring.          Relevant Medications    hydroCHLOROthiazide (HYDRODIURIL) 12.5 MG Tab "    Other Relevant Orders    Basic Metabolic Panel       Immunology/Multi System    Rheumatoid arthritis - Primary     Newly diagnosed rheumatoid arthritis; b/l hand and wrist swelling, + RF and +CCP; responded favorably to initial prednisone taper, though he has had relapse and requires a repeat taper. Obtaining b/l hand, wrist, and feet XR today and checking hepatitis serology. He should start DMARD therapy ASAP. He just got his medicaid number, so will reach out to referral clerk to schedule rheumatology appt at [facility]. Counseled at length about RA and the importance of care by a rheumatologist to improve disease control and complications; he expressed understanding and agreement with this plan.            Relevant Medications    predniSONE (DELTASONE) 10 MG tablet    Other Relevant Orders    X-Ray Foot 2 View Bilateral (Completed)    Hepatitis B Surface Antigen    Hepatitis B Core Antibody, Total    Hepatitis C Antibody    X-Ray Hand 2 View Right    X-Ray Wrist 2 View Right     Other Visit Diagnoses       Influenza vaccine needed        Relevant Orders    Influenza - Quadrivalent *Preferred* (6 months+) (PF) (Completed)              Face to face encounter time 20 minutes.    Non face to face encounter time 65 minutes.  Reviewing separate obtained history, counseling and educating the patient, family and/or other caregivers, ordering medications, tests or procedures; referring and communicating with other health care professionals; documenting clinical information in the electronic medical record; care coordination; independently interpreting results and communicating results to the patient, family, and/or caregivers.    Total time for visit  85 minutes

## 2022-10-21 NOTE — ASSESSMENT & PLAN NOTE
Above goal; likely related to steroid use and non-adherence to lisinopril. Given his c/f adverse effects, will stop lisinopril. Start HCTZ 12.5 mg, which he was previously taking and tolerating. RTC in 4 weeks to re-assess BP control after repeat steroid taper. He was previously having lower BP readings, so suspect he may be able to achieve control on monotherapy. Follow closely and continue good home BP monitoring.

## 2022-10-25 LAB — HBV CORE AB SERPL QL IA: NEGATIVE

## 2022-10-28 ENCOUNTER — TELEPHONE (OUTPATIENT)
Dept: FAMILY MEDICINE | Facility: CLINIC | Age: 57
End: 2022-10-28
Payer: MEDICAID

## 2022-10-28 DIAGNOSIS — M06.9 RHEUMATOID ARTHRITIS, INVOLVING UNSPECIFIED SITE, UNSPECIFIED WHETHER RHEUMATOID FACTOR PRESENT: ICD-10-CM

## 2022-10-28 RX ORDER — PREDNISONE 10 MG/1
TABLET ORAL
Qty: 18 TABLET | Refills: 0 | Status: SHIPPED | OUTPATIENT
Start: 2022-10-28 | End: 2022-11-04 | Stop reason: SDUPTHER

## 2022-10-28 NOTE — TELEPHONE ENCOUNTER
Patient called stating his swelling and joint pain worsened after decreasing prednisone from 20 mg daily to 10 mg daily. Advised him to increase prednisone to 20 mg immediately and restart taper. New prescription sent.

## 2022-11-04 ENCOUNTER — OFFICE VISIT (OUTPATIENT)
Dept: FAMILY MEDICINE | Facility: CLINIC | Age: 57
End: 2022-11-04
Payer: MEDICAID

## 2022-11-04 ENCOUNTER — APPOINTMENT (OUTPATIENT)
Dept: RADIOLOGY | Facility: CLINIC | Age: 57
End: 2022-11-04
Attending: STUDENT IN AN ORGANIZED HEALTH CARE EDUCATION/TRAINING PROGRAM
Payer: MEDICAID

## 2022-11-04 VITALS
SYSTOLIC BLOOD PRESSURE: 159 MMHG | OXYGEN SATURATION: 97 % | HEIGHT: 65 IN | TEMPERATURE: 98 F | BODY MASS INDEX: 30.66 KG/M2 | DIASTOLIC BLOOD PRESSURE: 100 MMHG | WEIGHT: 184 LBS | HEART RATE: 96 BPM

## 2022-11-04 DIAGNOSIS — M06.9 RHEUMATOID ARTHRITIS, INVOLVING UNSPECIFIED SITE, UNSPECIFIED WHETHER RHEUMATOID FACTOR PRESENT: ICD-10-CM

## 2022-11-04 DIAGNOSIS — M06.9 RHEUMATOID ARTHRITIS, INVOLVING UNSPECIFIED SITE, UNSPECIFIED WHETHER RHEUMATOID FACTOR PRESENT: Primary | ICD-10-CM

## 2022-11-04 PROCEDURE — 99215 OFFICE O/P EST HI 40 MIN: CPT | Mod: ,,, | Performed by: STUDENT IN AN ORGANIZED HEALTH CARE EDUCATION/TRAINING PROGRAM

## 2022-11-04 PROCEDURE — 1160F RVW MEDS BY RX/DR IN RCRD: CPT | Mod: CPTII,,, | Performed by: STUDENT IN AN ORGANIZED HEALTH CARE EDUCATION/TRAINING PROGRAM

## 2022-11-04 PROCEDURE — 3080F DIAST BP >= 90 MM HG: CPT | Mod: CPTII,,, | Performed by: STUDENT IN AN ORGANIZED HEALTH CARE EDUCATION/TRAINING PROGRAM

## 2022-11-04 PROCEDURE — 3077F PR MOST RECENT SYSTOLIC BLOOD PRESSURE >= 140 MM HG: ICD-10-PCS | Mod: CPTII,,, | Performed by: STUDENT IN AN ORGANIZED HEALTH CARE EDUCATION/TRAINING PROGRAM

## 2022-11-04 PROCEDURE — 3008F PR BODY MASS INDEX (BMI) DOCUMENTED: ICD-10-PCS | Mod: CPTII,,, | Performed by: STUDENT IN AN ORGANIZED HEALTH CARE EDUCATION/TRAINING PROGRAM

## 2022-11-04 PROCEDURE — 1159F PR MEDICATION LIST DOCUMENTED IN MEDICAL RECORD: ICD-10-PCS | Mod: CPTII,,, | Performed by: STUDENT IN AN ORGANIZED HEALTH CARE EDUCATION/TRAINING PROGRAM

## 2022-11-04 PROCEDURE — 99215 PR OFFICE/OUTPT VISIT, EST, LEVL V, 40-54 MIN: ICD-10-PCS | Mod: ,,, | Performed by: STUDENT IN AN ORGANIZED HEALTH CARE EDUCATION/TRAINING PROGRAM

## 2022-11-04 PROCEDURE — 3008F BODY MASS INDEX DOCD: CPT | Mod: CPTII,,, | Performed by: STUDENT IN AN ORGANIZED HEALTH CARE EDUCATION/TRAINING PROGRAM

## 2022-11-04 PROCEDURE — 71046 X-RAY EXAM CHEST 2 VIEWS: CPT | Mod: TC,RHCUB,FY | Performed by: STUDENT IN AN ORGANIZED HEALTH CARE EDUCATION/TRAINING PROGRAM

## 2022-11-04 PROCEDURE — 3080F PR MOST RECENT DIASTOLIC BLOOD PRESSURE >= 90 MM HG: ICD-10-PCS | Mod: CPTII,,, | Performed by: STUDENT IN AN ORGANIZED HEALTH CARE EDUCATION/TRAINING PROGRAM

## 2022-11-04 PROCEDURE — 1160F PR REVIEW ALL MEDS BY PRESCRIBER/CLIN PHARMACIST DOCUMENTED: ICD-10-PCS | Mod: CPTII,,, | Performed by: STUDENT IN AN ORGANIZED HEALTH CARE EDUCATION/TRAINING PROGRAM

## 2022-11-04 PROCEDURE — 1159F MED LIST DOCD IN RCRD: CPT | Mod: CPTII,,, | Performed by: STUDENT IN AN ORGANIZED HEALTH CARE EDUCATION/TRAINING PROGRAM

## 2022-11-04 PROCEDURE — 3077F SYST BP >= 140 MM HG: CPT | Mod: CPTII,,, | Performed by: STUDENT IN AN ORGANIZED HEALTH CARE EDUCATION/TRAINING PROGRAM

## 2022-11-04 RX ORDER — FOLIC ACID 1 MG/1
1 TABLET ORAL DAILY
Qty: 360 TABLET | Refills: 0 | Status: SHIPPED | OUTPATIENT
Start: 2022-11-04 | End: 2022-12-05 | Stop reason: SDUPTHER

## 2022-11-04 RX ORDER — PREDNISONE 10 MG/1
TABLET ORAL
Qty: 38 TABLET | Refills: 0 | Status: SHIPPED | OUTPATIENT
Start: 2022-11-04 | End: 2022-11-24

## 2022-11-04 RX ORDER — METHOTREXATE 2.5 MG/1
7.5 TABLET ORAL
Qty: 12 TABLET | Refills: 11 | Status: SHIPPED | OUTPATIENT
Start: 2022-11-04 | End: 2022-12-05 | Stop reason: SDUPTHER

## 2022-11-04 NOTE — PATIENT INSTRUCTIONS
- Start taking methotrexate once weekly   - Take folic acid every day. Methotrexate can lower the levels of this vitamin so it's very important to take a folic acid supplement

## 2022-11-08 NOTE — ASSESSMENT & PLAN NOTE
Having a very difficult time getting him into rheumatology. Continues in this flare. Will start MTX 7.5 mg weekly and increase prednisone back up to 40 mg for several more days (as this caused his flare to remit, previously.) Spoke with Billie at Dr. Caal's office at [facility]; they will try to work him in, but he does not have any openings for new patients until February. [facility] is also not accepting new patients. Counseled him extensively to monitor for ADR with MTX; he will call me in 1 week to let me know if symptoms are improving, and follow-up in-person in 4 weeks. He expressed understanding and agreement with this plan.

## 2022-11-08 NOTE — PROGRESS NOTES
"Subjective:       Patient ID: Clay Curtis is a 57 y.o. male.    Chief Complaint: Follow-up (arthritis)    HPI    57-year-old-man with newly diagnosed RA in this clinic here for follow-up.    He did not tolerate prednisone taper and has worsened b/l hand and wrist swelling as well as b/l ankle pain.         Objective:    BP (!) 159/100   Pulse 96   Temp 97.8 °F (36.6 °C) (Oral)   Ht 5' 5" (1.651 m)   Wt 83.5 kg (184 lb)   SpO2 97%   BMI 30.62 kg/m²     Physical Exam    Gen: well-groomed, well-dressed. No apparent distress  HEENT:  NCAT, symmetric  Pulm: normal work of breathing, no accessory muscle use; speaking complete sentences without having to stop  Neuro: CN II-XII grossly normal   MSK: bilateral hand swelling and tenosynovitis  Psych: pleasant affect, congruent mood. Linear thought; good eye contact  SKIN: no rashes present on face, neck, hands      Labs:    CRP 16;     CCP Ab >250  JOHANNE positive; ASO 99; SCOUT Ab positive (53); anti DsDNA postive (37)   SS-A positive (4.8)  RNP Ab IgG positive (1.1)    Hep B sAg neg; Hep B core Ab negative; Hep C neg       Imaging:     XR CHEST PA AND LATERAL     COMPARISON:  2020     FINDINGS:  No lines or tubes.     Lungs are clear.     Normal pleura.     Cardiac silhouette is normal     No obvious acute bone findings.     Impression:     No acute pulmonary disease         B/l hand and wrist XR showing   Degnerative changes of the interphalangeal joints and wrists      Assessment:       Problem List Items Addressed This Visit          Immunology/Multi System    Rheumatoid arthritis - Primary     Having a very difficult time getting him into rheumatology. Continues in this flare. Will start MTX 7.5 mg weekly and increase prednisone back up to 40 mg for several more days (as this caused his flare to remit, previously.) Spoke with Billie at Dr. Caal's office at [facility]; they will try to work him in, but he does not have any openings for new patients until " February. [facility] is also not accepting new patients. Counseled him extensively to monitor for ADR with MTX; he will call me in 1 week to let me know if symptoms are improving, and follow-up in-person in 4 weeks. He expressed understanding and agreement with this plan.            Relevant Medications    methotrexate 2.5 MG Tab    folic acid (FOLVITE) 1 MG tablet    predniSONE (DELTASONE) 10 MG tablet        Attestation of Time    Patient was counseled about overall management, including education for patient and when relevant for family, as well as anticipated benefits and possible risks of treatment options. Face-to-face time during this encounter was 15 minutes.     Non-face-to-face time -- including time preparing to see the patient; reviewing separately obtained history; counseling and educating the patient, family and/or caregivers; ordering medications, test, or procedures; referring and communicating with other health care professionals; documenting clinical information in the electronic or other health record; care coordination; independently interpreting results and communicating results to the patient, family, and/or caregivers -- was 45 minutes    Total time for this visit was 60 minutes.

## 2022-12-05 ENCOUNTER — LAB VISIT (OUTPATIENT)
Dept: LAB | Facility: HOSPITAL | Age: 57
End: 2022-12-05
Attending: STUDENT IN AN ORGANIZED HEALTH CARE EDUCATION/TRAINING PROGRAM
Payer: MEDICAID

## 2022-12-05 ENCOUNTER — OFFICE VISIT (OUTPATIENT)
Dept: FAMILY MEDICINE | Facility: CLINIC | Age: 57
End: 2022-12-05
Payer: MEDICAID

## 2022-12-05 VITALS
BODY MASS INDEX: 31.44 KG/M2 | OXYGEN SATURATION: 98 % | TEMPERATURE: 98 F | HEART RATE: 73 BPM | RESPIRATION RATE: 18 BRPM | DIASTOLIC BLOOD PRESSURE: 99 MMHG | WEIGHT: 195.63 LBS | SYSTOLIC BLOOD PRESSURE: 176 MMHG | HEIGHT: 66 IN

## 2022-12-05 DIAGNOSIS — R35.1 NOCTURIA: ICD-10-CM

## 2022-12-05 DIAGNOSIS — I10 HYPERTENSION, UNSPECIFIED TYPE: Primary | ICD-10-CM

## 2022-12-05 DIAGNOSIS — M06.9 RHEUMATOID ARTHRITIS, INVOLVING UNSPECIFIED SITE, UNSPECIFIED WHETHER RHEUMATOID FACTOR PRESENT: ICD-10-CM

## 2022-12-05 LAB
ANION GAP SERPL CALCULATED.3IONS-SCNC: 9 MMOL/L (ref 7–16)
BUN SERPL-MCNC: 21 MG/DL (ref 7–18)
BUN/CREAT SERPL: 15 (ref 6–20)
CALCIUM SERPL-MCNC: 9.1 MG/DL (ref 8.5–10.1)
CHLORIDE SERPL-SCNC: 107 MMOL/L (ref 98–107)
CO2 SERPL-SCNC: 32 MMOL/L (ref 21–32)
CREAT SERPL-MCNC: 1.38 MG/DL (ref 0.7–1.3)
EGFR (NO RACE VARIABLE) (RUSH/TITUS): 60 ML/MIN/1.73M²
GLUCOSE SERPL-MCNC: 119 MG/DL (ref 74–106)
POTASSIUM SERPL-SCNC: 4.1 MMOL/L (ref 3.5–5.1)
SODIUM SERPL-SCNC: 144 MMOL/L (ref 136–145)

## 2022-12-05 PROCEDURE — 3080F DIAST BP >= 90 MM HG: CPT | Mod: CPTII,,, | Performed by: STUDENT IN AN ORGANIZED HEALTH CARE EDUCATION/TRAINING PROGRAM

## 2022-12-05 PROCEDURE — 3008F PR BODY MASS INDEX (BMI) DOCUMENTED: ICD-10-PCS | Mod: CPTII,,, | Performed by: STUDENT IN AN ORGANIZED HEALTH CARE EDUCATION/TRAINING PROGRAM

## 2022-12-05 PROCEDURE — 1159F PR MEDICATION LIST DOCUMENTED IN MEDICAL RECORD: ICD-10-PCS | Mod: CPTII,,, | Performed by: STUDENT IN AN ORGANIZED HEALTH CARE EDUCATION/TRAINING PROGRAM

## 2022-12-05 PROCEDURE — 99215 PR OFFICE/OUTPT VISIT, EST, LEVL V, 40-54 MIN: ICD-10-PCS | Mod: ,,, | Performed by: STUDENT IN AN ORGANIZED HEALTH CARE EDUCATION/TRAINING PROGRAM

## 2022-12-05 PROCEDURE — 1160F RVW MEDS BY RX/DR IN RCRD: CPT | Mod: CPTII,,, | Performed by: STUDENT IN AN ORGANIZED HEALTH CARE EDUCATION/TRAINING PROGRAM

## 2022-12-05 PROCEDURE — 3077F PR MOST RECENT SYSTOLIC BLOOD PRESSURE >= 140 MM HG: ICD-10-PCS | Mod: CPTII,,, | Performed by: STUDENT IN AN ORGANIZED HEALTH CARE EDUCATION/TRAINING PROGRAM

## 2022-12-05 PROCEDURE — 99215 OFFICE O/P EST HI 40 MIN: CPT | Mod: ,,, | Performed by: STUDENT IN AN ORGANIZED HEALTH CARE EDUCATION/TRAINING PROGRAM

## 2022-12-05 PROCEDURE — 36415 COLL VENOUS BLD VENIPUNCTURE: CPT

## 2022-12-05 PROCEDURE — 3077F SYST BP >= 140 MM HG: CPT | Mod: CPTII,,, | Performed by: STUDENT IN AN ORGANIZED HEALTH CARE EDUCATION/TRAINING PROGRAM

## 2022-12-05 PROCEDURE — 1159F MED LIST DOCD IN RCRD: CPT | Mod: CPTII,,, | Performed by: STUDENT IN AN ORGANIZED HEALTH CARE EDUCATION/TRAINING PROGRAM

## 2022-12-05 PROCEDURE — 80048 BASIC METABOLIC PNL TOTAL CA: CPT

## 2022-12-05 PROCEDURE — 1160F PR REVIEW ALL MEDS BY PRESCRIBER/CLIN PHARMACIST DOCUMENTED: ICD-10-PCS | Mod: CPTII,,, | Performed by: STUDENT IN AN ORGANIZED HEALTH CARE EDUCATION/TRAINING PROGRAM

## 2022-12-05 PROCEDURE — 3080F PR MOST RECENT DIASTOLIC BLOOD PRESSURE >= 90 MM HG: ICD-10-PCS | Mod: CPTII,,, | Performed by: STUDENT IN AN ORGANIZED HEALTH CARE EDUCATION/TRAINING PROGRAM

## 2022-12-05 PROCEDURE — 3008F BODY MASS INDEX DOCD: CPT | Mod: CPTII,,, | Performed by: STUDENT IN AN ORGANIZED HEALTH CARE EDUCATION/TRAINING PROGRAM

## 2022-12-05 RX ORDER — TAMSULOSIN HYDROCHLORIDE 0.4 MG/1
0.4 CAPSULE ORAL NIGHTLY
Qty: 30 CAPSULE | Refills: 2 | Status: SHIPPED | OUTPATIENT
Start: 2022-12-05 | End: 2023-03-13 | Stop reason: SDUPTHER

## 2022-12-05 RX ORDER — METHOTREXATE 2.5 MG/1
7.5 TABLET ORAL
Qty: 12 TABLET | Refills: 11 | Status: SHIPPED | OUTPATIENT
Start: 2022-12-05 | End: 2023-12-13

## 2022-12-05 RX ORDER — FOLIC ACID 1 MG/1
1 TABLET ORAL DAILY
Qty: 360 TABLET | Refills: 0 | Status: SHIPPED | OUTPATIENT
Start: 2022-12-05 | End: 2023-01-09 | Stop reason: SDUPTHER

## 2022-12-05 RX ORDER — HYDROCHLOROTHIAZIDE 12.5 MG/1
12.5 TABLET ORAL DAILY
Qty: 30 TABLET | Refills: 1 | Status: SHIPPED | OUTPATIENT
Start: 2022-12-05 | End: 2023-03-20

## 2022-12-05 NOTE — PROGRESS NOTES
"Subjective:       Patient ID: Clay Curtis is a 57 y.o. male.    Chief Complaint: Urinary Frequency (Started over a month ago.)    HPI    57-year-old man with newly diagnosed RA on MTX and biologics, chronic steroid use (weaning), history of methamphetamine use, and current AUD (12 beers daily) who presents for follow-up    For the last several weeks reports bothersome nocturia and ED with his sexual partner (anorgasmia/difficulty ejaculating);     He denies not report any bothersome day time symptoms or weakened stream, urinary hesitancy, or dribbling.        Objective:    BP (!) 176/99 (BP Location: Right arm, Patient Position: Sitting, BP Method: X-Large (Automatic))   Pulse 73   Temp 98 °F (36.7 °C) (Oral)   Resp 18   Ht 5' 6" (1.676 m)   Wt 88.7 kg (195 lb 9.6 oz)   SpO2 98%   BMI 31.57 kg/m²     Physical Exam    Gen: well-groomed, well-dressed. No apparent distress  HEENT:  NCAT, symmetric  Pulm: normal work of breathing, no accessory muscle use; speaking complete sentences without having to stop.   Neuro: CN II-XII grossly normal   Psych: pleasant affect, congruent mood. Linear thought; good eye contact  SKIN: no rashes present on face, neck, hands    Assessment:       Problem List Items Addressed This Visit          Cardiac/Vascular    Hypertension - Primary     His blood pressure is markedly elevated. Will check BMP to evaluate for electrolyte disturbances or hyperglycemia. Recommended restarting HCTZ 12.5 mg qAM and starting tamsulosin 0.4 mg qhs. Counseled re: ADR. Although I believe his symptoms are more likely from elevated BP and possible hyperglycemia 2/2 chronic steroid use, BPH could certainly be playing a role. He will likely also benefit from the blood pressure lowering effects of alpha adrenergic blockage. I expect for his BP to improve concurrently with his steroid taper, as his blood pressure was, prior to this, reasonably well-controlled.          Relevant Medications    " hydroCHLOROthiazide (HYDRODIURIL) 12.5 MG Tab       Immunology/Multi System    Rheumatoid arthritis     Improving Nearly out of MTX; refill today, reports adherence to folic acid. Reviewed records from Dr. Caal (rheum) who started enbrel and initiated new steroid taper regimen and injected R knee with steroid.          Relevant Medications    methotrexate 2.5 MG Tab    folic acid (FOLVITE) 1 MG tablet     Other Visit Diagnoses       Nocturia        Relevant Medications    tamsulosin (FLOMAX) 0.4 mg Cap    Other Relevant Orders    Basic Metabolic Panel            Face to face encounter time 10 minutes.    Non face to face encounter time 30 minutes.  Reviewing separate obtained history, counseling and educating the patient, family and/or other caregivers, ordering medications, tests or procedures; referring and communicating with other health care professionals; documenting clinical information in the electronic medical record; care coordination; independently interpreting results and communicating results to the patient, family, and/or caregivers.    Total time for visit  40 minutes

## 2022-12-05 NOTE — ASSESSMENT & PLAN NOTE
Improving Nearly out of MTX; refill today, reports adherence to folic acid. Reviewed records from Dr. Caal (rheum) who started enbrel and initiated new steroid taper regimen and injected R knee with steroid.

## 2022-12-05 NOTE — ASSESSMENT & PLAN NOTE
His blood pressure is markedly elevated. Will check BMP to evaluate for electrolyte disturbances or hyperglycemia. Recommended restarting HCTZ 12.5 mg qAM and starting tamsulosin 0.4 mg qhs. Counseled re: ADR. Although I believe his symptoms are more likely from elevated BP and possible hyperglycemia 2/2 chronic steroid use, BPH could certainly be playing a role. He will likely also benefit from the blood pressure lowering effects of alpha adrenergic blockage. I expect for his BP to improve concurrently with his steroid taper, as his blood pressure was, prior to this, reasonably well-controlled.

## 2022-12-12 PROBLEM — N17.9 AKI (ACUTE KIDNEY INJURY): Status: RESOLVED | Noted: 2022-09-07 | Resolved: 2022-12-12

## 2022-12-16 ENCOUNTER — TELEPHONE (OUTPATIENT)
Dept: FAMILY MEDICINE | Facility: CLINIC | Age: 57
End: 2022-12-16
Payer: MEDICAID

## 2022-12-16 NOTE — TELEPHONE ENCOUNTER
----- Message from Dmitri Stover MD sent at 12/16/2022  9:00 AM CST -----  Please contact the patient and let them know that their results were stable and do not require any change in treatment.

## 2022-12-19 ENCOUNTER — OFFICE VISIT (OUTPATIENT)
Dept: FAMILY MEDICINE | Facility: CLINIC | Age: 57
End: 2022-12-19
Payer: MEDICAID

## 2022-12-19 VITALS
SYSTOLIC BLOOD PRESSURE: 182 MMHG | TEMPERATURE: 99 F | HEIGHT: 66 IN | DIASTOLIC BLOOD PRESSURE: 114 MMHG | WEIGHT: 200 LBS | BODY MASS INDEX: 32.14 KG/M2 | HEART RATE: 72 BPM | OXYGEN SATURATION: 98 %

## 2022-12-19 DIAGNOSIS — J06.9 UPPER RESPIRATORY TRACT INFECTION, UNSPECIFIED TYPE: Primary | ICD-10-CM

## 2022-12-19 DIAGNOSIS — J11.1 FLU: ICD-10-CM

## 2022-12-19 DIAGNOSIS — U07.1 COVID: ICD-10-CM

## 2022-12-19 DIAGNOSIS — J11.1 INFLUENZA: ICD-10-CM

## 2022-12-19 LAB
CTP QC/QA: YES
FLUAV AG NPH QL: POSITIVE
FLUBV AG NPH QL: NEGATIVE
SARS-COV-2 AG RESP QL IA.RAPID: POSITIVE

## 2022-12-19 PROCEDURE — 1159F PR MEDICATION LIST DOCUMENTED IN MEDICAL RECORD: ICD-10-PCS | Mod: CPTII,,, | Performed by: STUDENT IN AN ORGANIZED HEALTH CARE EDUCATION/TRAINING PROGRAM

## 2022-12-19 PROCEDURE — 3008F PR BODY MASS INDEX (BMI) DOCUMENTED: ICD-10-PCS | Mod: CPTII,,, | Performed by: STUDENT IN AN ORGANIZED HEALTH CARE EDUCATION/TRAINING PROGRAM

## 2022-12-19 PROCEDURE — 3008F BODY MASS INDEX DOCD: CPT | Mod: CPTII,,, | Performed by: STUDENT IN AN ORGANIZED HEALTH CARE EDUCATION/TRAINING PROGRAM

## 2022-12-19 PROCEDURE — 1160F RVW MEDS BY RX/DR IN RCRD: CPT | Mod: CPTII,,, | Performed by: STUDENT IN AN ORGANIZED HEALTH CARE EDUCATION/TRAINING PROGRAM

## 2022-12-19 PROCEDURE — 3080F PR MOST RECENT DIASTOLIC BLOOD PRESSURE >= 90 MM HG: ICD-10-PCS | Mod: CPTII,,, | Performed by: STUDENT IN AN ORGANIZED HEALTH CARE EDUCATION/TRAINING PROGRAM

## 2022-12-19 PROCEDURE — 3077F PR MOST RECENT SYSTOLIC BLOOD PRESSURE >= 140 MM HG: ICD-10-PCS | Mod: CPTII,,, | Performed by: STUDENT IN AN ORGANIZED HEALTH CARE EDUCATION/TRAINING PROGRAM

## 2022-12-19 PROCEDURE — 99214 OFFICE O/P EST MOD 30 MIN: CPT | Mod: ,,, | Performed by: STUDENT IN AN ORGANIZED HEALTH CARE EDUCATION/TRAINING PROGRAM

## 2022-12-19 PROCEDURE — 87428 SARSCOV & INF VIR A&B AG IA: CPT | Mod: RHCUB | Performed by: STUDENT IN AN ORGANIZED HEALTH CARE EDUCATION/TRAINING PROGRAM

## 2022-12-19 PROCEDURE — 3077F SYST BP >= 140 MM HG: CPT | Mod: CPTII,,, | Performed by: STUDENT IN AN ORGANIZED HEALTH CARE EDUCATION/TRAINING PROGRAM

## 2022-12-19 PROCEDURE — 1159F MED LIST DOCD IN RCRD: CPT | Mod: CPTII,,, | Performed by: STUDENT IN AN ORGANIZED HEALTH CARE EDUCATION/TRAINING PROGRAM

## 2022-12-19 PROCEDURE — 1160F PR REVIEW ALL MEDS BY PRESCRIBER/CLIN PHARMACIST DOCUMENTED: ICD-10-PCS | Mod: CPTII,,, | Performed by: STUDENT IN AN ORGANIZED HEALTH CARE EDUCATION/TRAINING PROGRAM

## 2022-12-19 PROCEDURE — 3080F DIAST BP >= 90 MM HG: CPT | Mod: CPTII,,, | Performed by: STUDENT IN AN ORGANIZED HEALTH CARE EDUCATION/TRAINING PROGRAM

## 2022-12-19 PROCEDURE — 99214 PR OFFICE/OUTPT VISIT, EST, LEVL IV, 30-39 MIN: ICD-10-PCS | Mod: ,,, | Performed by: STUDENT IN AN ORGANIZED HEALTH CARE EDUCATION/TRAINING PROGRAM

## 2022-12-19 RX ORDER — OSELTAMIVIR PHOSPHATE 75 MG/1
75 CAPSULE ORAL 2 TIMES DAILY
Qty: 10 CAPSULE | Refills: 0 | Status: SHIPPED | OUTPATIENT
Start: 2022-12-19 | End: 2022-12-24

## 2022-12-19 NOTE — PATIENT INSTRUCTIONS
"Symptomatic treatment for Covid:    Your covid test was positive.    You have had your vaccine, so your risk of hospitalization and serious illness is lower.     Rest and plenty of fluids is the best treatment! My personal favorites are hot mint tea with honey, broth based soups, water, and an afternoon nap.      You should not take over the counter decongestants(phenylephrine, pseudophedrine) due to your high blood pressure. Look for medicine without these ingredients.    Other cough medications(guaifenesin, dextromethorphan), and fever/pain meds (tylenol/acetaminophen, or ibuprofen) should be ok..     Again, since you have high blood pressure you should not use the decongestants. Instead try cetirizine or chlorpheniramine, you may also use Afrin nasal spray for up to 3 days for a stuffy nose.    Cough drops, nasal saline (spray or from a neti pot), humidifiers, and steam showers can help with your symptoms. Flonase may also be helpful for a runny nose.    Unfortunately antibiotics don't treat viruses. Fortunately, most people start to turn around by two weeks. However, if you are sick for longer than two weeks or if you have "double sickening" (you start feeling better and all of a sudden get worse again) please call or come back in as you may need antibiotics.     - -    Current CDC COVID-related Guidelines:     If You Test Positive for COVID-19 (Isolate) -- applies to everyone, regardless of vaccination status.  - Stay home for 5 days.  - If you have no symptoms or your symptoms are resolving after 5 days, you can leave your house.  - Continue to wear a mask around others for 5 additional days.  - If you have a fever, continue to stay home until your fever resolves.      Vaccine Guidelines:  If a person tests positive, CDC recommends they receive the COVID vaccine (including a booster) ASAP as soon as they complete their quarantine. As long as they are fever free, it is safe and effective to receive any " vaccine.  If a person receives monoclonal antibodies, they are not eligible to receive a COVID vaccine until 90 days after the infusion. They are able to receive all other (non-COVID) vaccines as usual.

## 2022-12-19 NOTE — ASSESSMENT & PLAN NOTE
Covid+ in clinic today. Has received covid vaccine previously. Overall, his symptoms are mild. Given that he is on methotrexate will not recommend paxlovid due to c/f medication interactions (and also his CKD).  Recommended monitoring closely at home with symptomatic treatment as per patient instructions and returning to clinic if symptoms worsen or fail to improve in the next few days. He expressed understanding and agreement with this plan.

## 2022-12-19 NOTE — PROGRESS NOTES
"Subjective:       Patient ID: Clay Curtis is a 57 y.o. male.    Chief Complaint: Cough (Congestion, headache X 4 days)    HPI    57-year-old man with the medical problems listed below who comes to clinic with cough and congestion. Overall his symptoms are mild, he is not feeling too bad. Has a side job and has been working on that despite being somewhat under the weather.         Objective:    BP (!) 182/114   Pulse 72   Temp 98.6 °F (37 °C) (Oral)   Ht 5' 6" (1.676 m)   Wt 90.7 kg (200 lb)   SpO2 98%   BMI 32.28 kg/m²     Physical Exam      Gen: well-groomed, well-dressed. No apparent distress  HEENT:  NCAT, symmetric  Pulm: normal work of breathing, no accessory muscle use; speaking complete sentences without having to stop  Neuro: CN II-XII grossly normal   Psych: pleasant affect, congruent mood. Linear thought; good eye contact  SKIN: no rashes present on face, neck, hands    Assessment / Plan:        Problem List Items Addressed This Visit          ID    COVID     Covid+ in clinic today. Has received covid vaccine previously. Overall, his symptoms are mild. Given that he is on methotrexate will not recommend paxlovid due to c/f medication interactions (and also his CKD).  Recommended monitoring closely at home with symptomatic treatment as per patient instructions and returning to clinic if symptoms worsen or fail to improve in the next few days. He expressed understanding and agreement with this plan.              Flu     Also flu+; start tamiflu          Other Visit Diagnoses       Upper respiratory tract infection, unspecified type    -  Primary    Relevant Orders    POCT SARS-COV2 (COVID) with Flu Antigen (Completed)    Influenza        Relevant Medications    oseltamivir (TAMIFLU) 75 MG capsule              Face to face encounter time 10 minutes.    Non face to face encounter time 20 minutes.  Reviewing separate obtained history, counseling and educating the patient, family and/or other " caregivers, ordering medications, tests or procedures; referring and communicating with other health care professionals; documenting clinical information in the electronic medical record; care coordination; independently interpreting results and communicating results to the patient, family, and/or caregivers.    Total time for visit  30 minutes

## 2023-01-09 ENCOUNTER — OFFICE VISIT (OUTPATIENT)
Dept: FAMILY MEDICINE | Facility: CLINIC | Age: 58
End: 2023-01-09
Payer: MEDICAID

## 2023-01-09 VITALS
HEART RATE: 68 BPM | HEIGHT: 66 IN | TEMPERATURE: 98 F | WEIGHT: 203 LBS | OXYGEN SATURATION: 100 % | SYSTOLIC BLOOD PRESSURE: 164 MMHG | BODY MASS INDEX: 32.62 KG/M2 | DIASTOLIC BLOOD PRESSURE: 98 MMHG

## 2023-01-09 DIAGNOSIS — U07.1 COVID: ICD-10-CM

## 2023-01-09 DIAGNOSIS — R35.1 NOCTURIA: ICD-10-CM

## 2023-01-09 DIAGNOSIS — I10 HYPERTENSION, UNSPECIFIED TYPE: ICD-10-CM

## 2023-01-09 DIAGNOSIS — M06.9 RHEUMATOID ARTHRITIS, INVOLVING UNSPECIFIED SITE, UNSPECIFIED WHETHER RHEUMATOID FACTOR PRESENT: ICD-10-CM

## 2023-01-09 DIAGNOSIS — Z12.11 SCREEN FOR COLON CANCER: Primary | ICD-10-CM

## 2023-01-09 PROCEDURE — 3080F PR MOST RECENT DIASTOLIC BLOOD PRESSURE >= 90 MM HG: ICD-10-PCS | Mod: CPTII,,, | Performed by: STUDENT IN AN ORGANIZED HEALTH CARE EDUCATION/TRAINING PROGRAM

## 2023-01-09 PROCEDURE — 3008F BODY MASS INDEX DOCD: CPT | Mod: CPTII,,, | Performed by: STUDENT IN AN ORGANIZED HEALTH CARE EDUCATION/TRAINING PROGRAM

## 2023-01-09 PROCEDURE — 3080F DIAST BP >= 90 MM HG: CPT | Mod: CPTII,,, | Performed by: STUDENT IN AN ORGANIZED HEALTH CARE EDUCATION/TRAINING PROGRAM

## 2023-01-09 PROCEDURE — 1159F MED LIST DOCD IN RCRD: CPT | Mod: CPTII,,, | Performed by: STUDENT IN AN ORGANIZED HEALTH CARE EDUCATION/TRAINING PROGRAM

## 2023-01-09 PROCEDURE — 1160F RVW MEDS BY RX/DR IN RCRD: CPT | Mod: CPTII,,, | Performed by: STUDENT IN AN ORGANIZED HEALTH CARE EDUCATION/TRAINING PROGRAM

## 2023-01-09 PROCEDURE — 3008F PR BODY MASS INDEX (BMI) DOCUMENTED: ICD-10-PCS | Mod: CPTII,,, | Performed by: STUDENT IN AN ORGANIZED HEALTH CARE EDUCATION/TRAINING PROGRAM

## 2023-01-09 PROCEDURE — 1159F PR MEDICATION LIST DOCUMENTED IN MEDICAL RECORD: ICD-10-PCS | Mod: CPTII,,, | Performed by: STUDENT IN AN ORGANIZED HEALTH CARE EDUCATION/TRAINING PROGRAM

## 2023-01-09 PROCEDURE — 99214 PR OFFICE/OUTPT VISIT, EST, LEVL IV, 30-39 MIN: ICD-10-PCS | Mod: ,,, | Performed by: STUDENT IN AN ORGANIZED HEALTH CARE EDUCATION/TRAINING PROGRAM

## 2023-01-09 PROCEDURE — 99214 OFFICE O/P EST MOD 30 MIN: CPT | Mod: ,,, | Performed by: STUDENT IN AN ORGANIZED HEALTH CARE EDUCATION/TRAINING PROGRAM

## 2023-01-09 PROCEDURE — 3077F SYST BP >= 140 MM HG: CPT | Mod: CPTII,,, | Performed by: STUDENT IN AN ORGANIZED HEALTH CARE EDUCATION/TRAINING PROGRAM

## 2023-01-09 PROCEDURE — 1160F PR REVIEW ALL MEDS BY PRESCRIBER/CLIN PHARMACIST DOCUMENTED: ICD-10-PCS | Mod: CPTII,,, | Performed by: STUDENT IN AN ORGANIZED HEALTH CARE EDUCATION/TRAINING PROGRAM

## 2023-01-09 PROCEDURE — 3077F PR MOST RECENT SYSTOLIC BLOOD PRESSURE >= 140 MM HG: ICD-10-PCS | Mod: CPTII,,, | Performed by: STUDENT IN AN ORGANIZED HEALTH CARE EDUCATION/TRAINING PROGRAM

## 2023-01-09 RX ORDER — AMLODIPINE BESYLATE 5 MG/1
5 TABLET ORAL DAILY
Qty: 30 TABLET | Refills: 11 | Status: SHIPPED | OUTPATIENT
Start: 2023-01-09 | End: 2023-11-01 | Stop reason: SDUPTHER

## 2023-01-09 RX ORDER — PREDNISONE 10 MG/1
TABLET ORAL
COMMUNITY
Start: 2023-01-02 | End: 2023-12-13

## 2023-01-09 RX ORDER — FOLIC ACID 1 MG/1
1 TABLET ORAL DAILY
Qty: 360 TABLET | Refills: 0 | Status: SHIPPED | OUTPATIENT
Start: 2023-01-09 | End: 2023-09-20

## 2023-01-09 NOTE — PATIENT INSTRUCTIONS
- Start taking the amlodipine 5 mg tablets along with your 12.5 mg of hydrochlorothiazide  - Keep taking your tamsulosin at night  - Monitor your blood pressure in your log  - Your goal is < 130 / 80  - If your blood pressure remains elevated, we may increase the amlodipine slightly  - If this does not control the blood pressure, we could try a medicine called Losartan. It comes in a combination with hydrochlorothiazide, and it also helps protect the kidneys. This medication does require routine blood draws, however.

## 2023-01-09 NOTE — PROGRESS NOTES
"Subjective:       Patient ID: Clay Curtis is a 57 y.o. male.    Chief Complaint: Follow-up    HPI    57-year-old man with the medical problems listed below who comes to clinic for follow-up for RA and HTN.    He is continuing on prednisone 40 mg daily (he had to increase) due to joint pain.   He is avoiding NSAIDs for pain.     Recently saw Dr Caal who referred him to a "a real rheumatologist" ?    He has been tolerating the tamsulosin and it has improved his nocturia. Had dizziness once or twice but that has mostly stopped.     No longer taking lisinopril (not sure why this was stopped).    He has recovered from covid well with no lingering symptoms           Objective:      BP (!) 164/98   Pulse 68   Temp 97.6 °F (36.4 °C) (Oral)   Ht 5' 6" (1.676 m)   Wt 92.1 kg (203 lb)   SpO2 100%   BMI 32.77 kg/m²     Physical Exam   Gen: well-groomed, well-dressed. No apparent distress  HEENT:  NCAT, symmetric  Pulm: normal work of breathing, no accessory muscle use; speaking complete sentences  Neuro: CN II-XII grossly normal   Psych: pleasant affect, congruent mood. Linear thought; good eye contact  SKIN: no rashes present on face, neck, hands     Assessment / Plan:        Problem List Items Addressed This Visit       Hypertension     Uncontrolled; tolerating HCTZ and tamsulosin without adverse effects. Has not been able to taper his steroids yet, which are undoubtedly contributing to his HTN. Start amlodipine today.    Pt instructions:    - Start taking the amlodipine 5 mg tablets along with your 12.5 mg of hydrochlorothiazide  - Keep taking your tamsulosin at night  - Monitor your blood pressure in your log  - Your goal is < 130 / 80  - If your blood pressure remains elevated, we may increase the amlodipine slightly  - If this does not control the blood pressure, we could try a medicine called Losartan. It comes in a combination with hydrochlorothiazide, and it also helps protect the kidneys. This medication " does require routine blood draws, however.         Relevant Medications    amLODIPine (NORVASC) 5 MG tablet    Rheumatoid arthritis     Doing well, restarting prednisone taper according to him. He is on Enbrel and MTX as well. Refill folate. F/u with rheum as scheduled. Recommended Tylenol q8h for joint pain, NSAIDs sparingly.          Relevant Medications    folic acid (FOLVITE) 1 MG tablet    COVID     recovered from covid well with no lingering symptoms           Screen for colon cancer - Primary    Relevant Orders    Ambulatory referral/consult to Gastroenterology    Nocturia     Improving with tamsulosin; continue             Face to face encounter time 15 minutes.    Non face to face encounter time 20 minutes.  Reviewing separate obtained history, counseling and educating the patient, family and/or other caregivers, ordering medications, tests or procedures; referring and communicating with other health care professionals; documenting clinical information in the electronic medical record; care coordination; independently interpreting results and communicating results to the patient, family, and/or caregivers.    Total time for visit  35 minutes

## 2023-01-10 ENCOUNTER — IMMUNIZATION (OUTPATIENT)
Dept: FAMILY MEDICINE | Facility: CLINIC | Age: 58
End: 2023-01-10
Payer: MEDICAID

## 2023-01-10 DIAGNOSIS — Z23 NEED FOR VACCINATION: Primary | ICD-10-CM

## 2023-01-10 PROBLEM — R35.1 NOCTURIA: Status: ACTIVE | Noted: 2023-01-10

## 2023-01-10 PROBLEM — Z12.11 SCREEN FOR COLON CANCER: Status: ACTIVE | Noted: 2023-01-10

## 2023-01-10 PROCEDURE — 0134A COVID-19, MRNA, LNP-S, BIVALENT BOOSTER, PF, 50 MCG/0.5 ML: CPT | Mod: ,,, | Performed by: STUDENT IN AN ORGANIZED HEALTH CARE EDUCATION/TRAINING PROGRAM

## 2023-01-10 PROCEDURE — 0134A COVID-19, MRNA, LNP-S, BIVALENT BOOSTER, PF, 50 MCG/0.5 ML: ICD-10-PCS | Mod: ,,, | Performed by: STUDENT IN AN ORGANIZED HEALTH CARE EDUCATION/TRAINING PROGRAM

## 2023-01-10 PROCEDURE — 91313 COVID-19, MRNA, LNP-S, BIVALENT BOOSTER, PF, 50 MCG/0.5 ML: CPT | Mod: ,,, | Performed by: STUDENT IN AN ORGANIZED HEALTH CARE EDUCATION/TRAINING PROGRAM

## 2023-01-10 PROCEDURE — 91313 COVID-19, MRNA, LNP-S, BIVALENT BOOSTER, PF, 50 MCG/0.5 ML: ICD-10-PCS | Mod: ,,, | Performed by: STUDENT IN AN ORGANIZED HEALTH CARE EDUCATION/TRAINING PROGRAM

## 2023-01-10 NOTE — ASSESSMENT & PLAN NOTE
Uncontrolled; tolerating HCTZ and tamsulosin without adverse effects. Has not been able to taper his steroids yet, which are undoubtedly contributing to his HTN. Start amlodipine today.    Pt instructions:    - Start taking the amlodipine 5 mg tablets along with your 12.5 mg of hydrochlorothiazide  - Keep taking your tamsulosin at night  - Monitor your blood pressure in your log  - Your goal is < 130 / 80  - If your blood pressure remains elevated, we may increase the amlodipine slightly  - If this does not control the blood pressure, we could try a medicine called Losartan. It comes in a combination with hydrochlorothiazide, and it also helps protect the kidneys. This medication does require routine blood draws, however.

## 2023-01-10 NOTE — ASSESSMENT & PLAN NOTE
Doing well, restarting prednisone taper according to him. He is on Enbrel and MTX as well. Refill folate. F/u with rheum as scheduled. Recommended Tylenol q8h for joint pain, NSAIDs sparingly.

## 2023-02-08 ENCOUNTER — TELEPHONE (OUTPATIENT)
Dept: FAMILY MEDICINE | Facility: CLINIC | Age: 58
End: 2023-02-08
Payer: MEDICAID

## 2023-02-08 DIAGNOSIS — Z01.818 PRE-OP EVALUATION: Primary | ICD-10-CM

## 2023-02-08 DIAGNOSIS — H91.90 HEARING LOSS, UNSPECIFIED HEARING LOSS TYPE, UNSPECIFIED LATERALITY: ICD-10-CM

## 2023-02-08 NOTE — TELEPHONE ENCOUNTER
[facility] requesting cardiac workup prior to colonoscopy; after further discussion with the pt, he reports a prior hospitalization several years ago and was told he might have CHF. Since I have known him he has been clinically stable from a cardiac standpoint with no signs/symptoms of volume overload. They have requested TTE, so will place cards referral for this purpose.     Additionally, he is requesting hearing eval due to difficulty in certain situations. Will refer to ENT / audiology

## 2023-02-22 ENCOUNTER — OFFICE VISIT (OUTPATIENT)
Dept: FAMILY MEDICINE | Facility: CLINIC | Age: 58
End: 2023-02-22
Payer: MEDICAID

## 2023-02-22 VITALS
HEIGHT: 66 IN | HEART RATE: 77 BPM | OXYGEN SATURATION: 98 % | BODY MASS INDEX: 34.55 KG/M2 | SYSTOLIC BLOOD PRESSURE: 130 MMHG | TEMPERATURE: 98 F | DIASTOLIC BLOOD PRESSURE: 86 MMHG | WEIGHT: 215 LBS

## 2023-02-22 DIAGNOSIS — R35.1 NOCTURIA: ICD-10-CM

## 2023-02-22 DIAGNOSIS — I15.9 SECONDARY HYPERTENSION: Primary | ICD-10-CM

## 2023-02-22 DIAGNOSIS — Z12.11 SCREEN FOR COLON CANCER: ICD-10-CM

## 2023-02-22 DIAGNOSIS — M06.9 RHEUMATOID ARTHRITIS, INVOLVING UNSPECIFIED SITE, UNSPECIFIED WHETHER RHEUMATOID FACTOR PRESENT: ICD-10-CM

## 2023-02-22 PROCEDURE — 99214 PR OFFICE/OUTPT VISIT, EST, LEVL IV, 30-39 MIN: ICD-10-PCS | Mod: ,,, | Performed by: STUDENT IN AN ORGANIZED HEALTH CARE EDUCATION/TRAINING PROGRAM

## 2023-02-22 PROCEDURE — 1160F PR REVIEW ALL MEDS BY PRESCRIBER/CLIN PHARMACIST DOCUMENTED: ICD-10-PCS | Mod: CPTII,,, | Performed by: STUDENT IN AN ORGANIZED HEALTH CARE EDUCATION/TRAINING PROGRAM

## 2023-02-22 PROCEDURE — 99214 OFFICE O/P EST MOD 30 MIN: CPT | Mod: ,,, | Performed by: STUDENT IN AN ORGANIZED HEALTH CARE EDUCATION/TRAINING PROGRAM

## 2023-02-22 PROCEDURE — 1159F MED LIST DOCD IN RCRD: CPT | Mod: CPTII,,, | Performed by: STUDENT IN AN ORGANIZED HEALTH CARE EDUCATION/TRAINING PROGRAM

## 2023-02-22 PROCEDURE — 3075F PR MOST RECENT SYSTOLIC BLOOD PRESS GE 130-139MM HG: ICD-10-PCS | Mod: CPTII,,, | Performed by: STUDENT IN AN ORGANIZED HEALTH CARE EDUCATION/TRAINING PROGRAM

## 2023-02-22 PROCEDURE — 3075F SYST BP GE 130 - 139MM HG: CPT | Mod: CPTII,,, | Performed by: STUDENT IN AN ORGANIZED HEALTH CARE EDUCATION/TRAINING PROGRAM

## 2023-02-22 PROCEDURE — 3079F PR MOST RECENT DIASTOLIC BLOOD PRESSURE 80-89 MM HG: ICD-10-PCS | Mod: CPTII,,, | Performed by: STUDENT IN AN ORGANIZED HEALTH CARE EDUCATION/TRAINING PROGRAM

## 2023-02-22 PROCEDURE — 3079F DIAST BP 80-89 MM HG: CPT | Mod: CPTII,,, | Performed by: STUDENT IN AN ORGANIZED HEALTH CARE EDUCATION/TRAINING PROGRAM

## 2023-02-22 PROCEDURE — 3008F BODY MASS INDEX DOCD: CPT | Mod: CPTII,,, | Performed by: STUDENT IN AN ORGANIZED HEALTH CARE EDUCATION/TRAINING PROGRAM

## 2023-02-22 PROCEDURE — 1159F PR MEDICATION LIST DOCUMENTED IN MEDICAL RECORD: ICD-10-PCS | Mod: CPTII,,, | Performed by: STUDENT IN AN ORGANIZED HEALTH CARE EDUCATION/TRAINING PROGRAM

## 2023-02-22 PROCEDURE — 3008F PR BODY MASS INDEX (BMI) DOCUMENTED: ICD-10-PCS | Mod: CPTII,,, | Performed by: STUDENT IN AN ORGANIZED HEALTH CARE EDUCATION/TRAINING PROGRAM

## 2023-02-22 PROCEDURE — 1160F RVW MEDS BY RX/DR IN RCRD: CPT | Mod: CPTII,,, | Performed by: STUDENT IN AN ORGANIZED HEALTH CARE EDUCATION/TRAINING PROGRAM

## 2023-02-22 NOTE — ASSESSMENT & PLAN NOTE
"Discussed his plan to for colo at [facility] and their request for cardiac eval prior to the procedure; he reports a remote hx of "congestive heart failure" where they "drained some fluid from around the heart." This was done in [place]. He has remained euvolemic with out any s/s of heart failure so unclear what this was about, however, agree with cards assessment that [facility] is pursuing in order to clarify anatomy/function with TTE.   "

## 2023-02-22 NOTE — ASSESSMENT & PLAN NOTE
Tolerating amlodipine and HCTZ without adverse effects. Blood pressure is very close to goal today. Continue to monitor this at home, and I asked him to notify me if it is consistently above 130/80. Continue  amlodipine 5 mg tablets along with 12.5 mg of hydrochlorothiazide. Recent labs reviewed today from his last rheum appointment and wnl.

## 2023-02-22 NOTE — PROGRESS NOTES
"Subjective:       Patient ID: Clay Curtis is a 57 y.o. male.    Chief Complaint: Hypertension    HPI    Seen at [facility] on 1/23/23; he was recommended to continue MTX 7.5 mg weekly as well as Enbrel weekly. He is also continuing on allopurinol 100 mg daily for gout and his last uric acid was 6.5. He was put on a prednisone taper as follows: 20 mg daily for 1 week, then decrease to 10 mg daily until his next [facility] visit in approx 3 months.     He has successfully tapered down to 10 mg daily, though he does still have some bothersome stiffness in his wrists, worse in the morning. He wants to get some Tylenol arthritis and see if that helps with his symptoms.     His nocturia has improved. Denies ADR.     Blood pressure is better today.     Outside problem list:     Anemia   Bipolar disorder ([facility])   Carpal tunnel syndrome of right wrist   Chronic midline low back pain without sciatica   Dysarthria   Erectile dysfunction   Hearing loss of left ear   Hyperlipidemia   Essential hypertension   Seropositive rheumatoid arthritis of multiple sites ([facility])   Primary generalized (osteo)arthritis   Idiopathic chronic gout of multiple sites without tophus   Positive JOHANNE (antinuclear antibody)   High risk medication use   Alcohol abuse   Stage 3a chronic kidney disease ([facility])         Objective:    /86   Pulse 77   Temp 97.5 °F (36.4 °C) (Oral)   Ht 5' 6" (1.676 m)   Wt 97.5 kg (215 lb)   SpO2 98%   BMI 34.70 kg/m²     Physical Exam   Gen: well-groomed, well-dressed. No apparent distress  HEENT:  NCAT, symmetric  Pulm: normal work of breathing, no accessory muscle use; speaking complete sentences without having to stop  Neuro: CN II-XII grossly normal   Psych: pleasant affect, congruent mood. Linear thought; good eye contact  SKIN: no rashes present on face, neck, hands     Assessment:       Problem List Items Addressed This Visit       Hypertension - Primary     Tolerating amlodipine and HCTZ without adverse effects. " "Blood pressure is very close to goal today. Continue to monitor this at home, and I asked him to notify me if it is consistently above 130/80. Continue  amlodipine 5 mg tablets along with 12.5 mg of hydrochlorothiazide. Recent labs reviewed today from his last rheum appointment and wnl.              Rheumatoid arthritis     Good control with MTX / folate, Enbrel, and prednisone. Tolerating pred taper successfully. Discussed steroid sparing regimen and will add on Tylenol Arthritis. Also discussed potential referral to pain management if this is not adequate.         Screen for colon cancer     Discussed his plan to for colo at [facility] and their request for cardiac eval prior to the procedure; he reports a remote hx of "congestive heart failure" where they "drained some fluid from around the heart." This was done in [place]. He has remained euvolemic with out any s/s of heart failure so unclear what this was about, however, agree with cards assessment that [facility] is pursuing in order to clarify anatomy/function with TTE.          Nocturia     Improved; continue tamsulosin. No refill needed at this time.             Face to face encounter time 10 minutes.    Non face to face encounter time 20 minutes.  Reviewing separate obtained history, counseling and educating the patient, family and/or other caregivers, ordering medications, tests or procedures; referring and communicating with other health care professionals; documenting clinical information in the electronic medical record; care coordination; independently interpreting results and communicating results to the patient, family, and/or caregivers.    Total time for visit  30 minutes      "

## 2023-02-22 NOTE — ASSESSMENT & PLAN NOTE
Good control with MTX / folate, Enbrel, and prednisone. Tolerating pred taper successfully. Discussed steroid sparing regimen and will add on Tylenol Arthritis. Also discussed potential referral to pain management if this is not adequate.

## 2023-05-04 ENCOUNTER — OFFICE VISIT (OUTPATIENT)
Dept: FAMILY MEDICINE | Facility: CLINIC | Age: 58
End: 2023-05-04
Payer: MEDICAID

## 2023-05-04 VITALS
SYSTOLIC BLOOD PRESSURE: 127 MMHG | WEIGHT: 224 LBS | TEMPERATURE: 98 F | HEART RATE: 71 BPM | DIASTOLIC BLOOD PRESSURE: 77 MMHG | OXYGEN SATURATION: 97 % | HEIGHT: 66 IN | BODY MASS INDEX: 36 KG/M2 | RESPIRATION RATE: 18 BRPM

## 2023-05-04 DIAGNOSIS — H66.92 LEFT OTITIS MEDIA, UNSPECIFIED OTITIS MEDIA TYPE: Primary | ICD-10-CM

## 2023-05-04 PROCEDURE — 3008F PR BODY MASS INDEX (BMI) DOCUMENTED: ICD-10-PCS | Mod: CPTII,,, | Performed by: NURSE PRACTITIONER

## 2023-05-04 PROCEDURE — 1160F PR REVIEW ALL MEDS BY PRESCRIBER/CLIN PHARMACIST DOCUMENTED: ICD-10-PCS | Mod: CPTII,,, | Performed by: NURSE PRACTITIONER

## 2023-05-04 PROCEDURE — 3008F BODY MASS INDEX DOCD: CPT | Mod: CPTII,,, | Performed by: NURSE PRACTITIONER

## 2023-05-04 PROCEDURE — 99213 PR OFFICE/OUTPT VISIT, EST, LEVL III, 20-29 MIN: ICD-10-PCS | Mod: ,,, | Performed by: NURSE PRACTITIONER

## 2023-05-04 PROCEDURE — 1159F MED LIST DOCD IN RCRD: CPT | Mod: CPTII,,, | Performed by: NURSE PRACTITIONER

## 2023-05-04 PROCEDURE — 3078F DIAST BP <80 MM HG: CPT | Mod: CPTII,,, | Performed by: NURSE PRACTITIONER

## 2023-05-04 PROCEDURE — 1160F RVW MEDS BY RX/DR IN RCRD: CPT | Mod: CPTII,,, | Performed by: NURSE PRACTITIONER

## 2023-05-04 PROCEDURE — 99213 OFFICE O/P EST LOW 20 MIN: CPT | Mod: ,,, | Performed by: NURSE PRACTITIONER

## 2023-05-04 PROCEDURE — 3074F SYST BP LT 130 MM HG: CPT | Mod: CPTII,,, | Performed by: NURSE PRACTITIONER

## 2023-05-04 PROCEDURE — 3074F PR MOST RECENT SYSTOLIC BLOOD PRESSURE < 130 MM HG: ICD-10-PCS | Mod: CPTII,,, | Performed by: NURSE PRACTITIONER

## 2023-05-04 PROCEDURE — 3078F PR MOST RECENT DIASTOLIC BLOOD PRESSURE < 80 MM HG: ICD-10-PCS | Mod: CPTII,,, | Performed by: NURSE PRACTITIONER

## 2023-05-04 PROCEDURE — 1159F PR MEDICATION LIST DOCUMENTED IN MEDICAL RECORD: ICD-10-PCS | Mod: CPTII,,, | Performed by: NURSE PRACTITIONER

## 2023-05-04 RX ORDER — AMOXICILLIN 500 MG/1
1000 CAPSULE ORAL EVERY 8 HOURS
Qty: 42 CAPSULE | Refills: 0 | Status: SHIPPED | OUTPATIENT
Start: 2023-05-04 | End: 2023-05-11

## 2023-05-04 NOTE — PATIENT INSTRUCTIONS
Take full course of antibiotics until completed. Return in 48-72 hours if your symptoms do not improve and we can recheck your ear to see if we need to change antibiotics.

## 2023-05-04 NOTE — PROGRESS NOTES
ROYER Head   [place] [address] [place]  [address]     PATIENT NAME: Clay Curtis  : 1965  DATE: 23  MRN: [id number]      Billing Provider: ROYER Head  Level of Service: RI OFFICE/OUTPT VISIT, EST, LEVL III, 20-29 MIN  Patient PCP Information       Provider PCP Type    Dmitri Stover MD General            Reason for Visit / Chief Complaint: Otalgia (Pt states he has been feeling like there is a foreign object in his ear for 3 days states he tried to flush ear with water and peroxide.)       Update PCP  Update Chief Complaint         History of Present Illness / Problem Focused Workflow     Clay Curtis presents to the clinic with Otalgia (Pt states he has been feeling like there is a foreign object in his ear for 3 days states he tried to flush ear with water and peroxide.)     58 y/o male presents for left ear pain, feels like something is in there since 2-3 days ago. Tried flushing it with peroxide by could not get anything out.     Otalgia       Review of Systems     Review of Systems   Constitutional: Negative.    HENT:  Positive for ear pain.    Eyes: Negative.    Respiratory: Negative.     Cardiovascular: Negative.    Gastrointestinal: Negative.    Endocrine: Negative.    Genitourinary: Negative.    Musculoskeletal: Negative.    Integumentary:  Negative.   Allergic/Immunologic: Negative.    Neurological: Negative.    Hematological: Negative.    Psychiatric/Behavioral: Negative.     All other systems reviewed and are negative.     Medical / Social / Family History     Past Medical History:   Diagnosis Date    Hypertension     Mixed hyperlipidemia        Past Surgical History:   Procedure Laterality Date    CARDIAC SURGERY         Social History  Mr. Curtis  reports that he has never smoked. He has never been exposed to tobacco smoke. His smokeless tobacco use includes snuff. He reports that he does not currently use alcohol after a past usage of  about 12.0 standard drinks per week. He reports that he does not currently use drugs.    Family History  Mr.'s Curtis family history includes Diabetes in his brother, mother, and sister; Hypertension in his brother, mother, and sister.    Medications and Allergies     Medications  Outpatient Medications Marked as Taking for the 5/4/23 encounter (Office Visit) with ROYER Head   Medication Sig Dispense Refill    allopurinoL (ZYLOPRIM) 100 MG tablet Take 1 tablet (100 mg total) by mouth once daily. 30 tablet 0    amLODIPine (NORVASC) 5 MG tablet Take 1 tablet (5 mg total) by mouth once daily. 30 tablet 11    etanercept (ENBREL SUBQ) Inject into the skin once a week.      folic acid (FOLVITE) 1 MG tablet Take 1 tablet (1 mg total) by mouth once daily. 360 tablet 0    hydroCHLOROthiazide (MICROZIDE) 12.5 mg capsule TAKE ONE CAPSULE BY MOUTH EVERY DAY 30 capsule 1    methotrexate 2.5 MG Tab Take 3 tablets (7.5 mg total) by mouth every 7 days. 12 tablet 11    predniSONE (DELTASONE) 10 MG tablet Take by mouth.      tamsulosin (FLOMAX) 0.4 mg Cap TAKE ONE CAPSULE BY MOUTH EVERY EVENING 30 capsule 2       Allergies  Review of patient's allergies indicates:   Allergen Reactions    Tropicamide Swelling    Atorvastatin Itching     itching  itching         Physical Examination     Vitals:    05/04/23 1436   BP: 127/77   Pulse: 71   Resp: 18   Temp: 98 °F (36.7 °C)     Physical Exam  Vitals and nursing note reviewed.   Constitutional:       General: He is not in acute distress.     Appearance: Normal appearance. He is not ill-appearing, toxic-appearing or diaphoretic.   HENT:      Head: Normocephalic and atraumatic.      Right Ear: Tympanic membrane, ear canal and external ear normal.      Left Ear: A middle ear effusion is present. Tympanic membrane is erythematous and bulging.      Mouth/Throat:      Mouth: Mucous membranes are moist.      Pharynx: Oropharynx is clear.   Eyes:      Extraocular Movements: Extraocular  movements intact.      Conjunctiva/sclera: Conjunctivae normal.      Pupils: Pupils are equal, round, and reactive to light.   Cardiovascular:      Rate and Rhythm: Normal rate and regular rhythm.      Pulses: Normal pulses.      Heart sounds: Normal heart sounds.   Pulmonary:      Effort: Pulmonary effort is normal.      Breath sounds: Normal breath sounds.   Abdominal:      General: Abdomen is flat. Bowel sounds are normal.      Palpations: Abdomen is soft.   Musculoskeletal:         General: Normal range of motion.      Cervical back: Normal range of motion.   Lymphadenopathy:      Head:      Left side of head: Posterior auricular adenopathy present.   Skin:     General: Skin is warm and dry.      Capillary Refill: Capillary refill takes less than 2 seconds.   Neurological:      General: No focal deficit present.      Mental Status: He is alert and oriented to person, place, and time. Mental status is at baseline.   Psychiatric:         Mood and Affect: Mood normal.         Behavior: Behavior normal.         Thought Content: Thought content normal.         Judgment: Judgment normal.            Assessment and Plan (including Health Maintenance)      Problem List  Smart WiFast  Document Outside HM   :    Plan:   Left otitis media, unspecified otitis media type  -     amoxicillin (AMOXIL) 500 MG capsule; Take 2 capsules (1,000 mg total) by mouth every 8 (eight) hours. for 7 days  Dispense: 42 capsule; Refill: 0           Health Maintenance Due   Topic Date Due    Pneumococcal Vaccines (Age 0-64) (1 - PCV) Never done    HIV Screening  Never done    TETANUS VACCINE  Never done    Shingles Vaccine (1 of 2) Never done    Colorectal Cancer Screening  Never done       Problem List Items Addressed This Visit    None  Visit Diagnoses       Left otitis media, unspecified otitis media type    -  Primary    Relevant Medications    amoxicillin (AMOXIL) 500 MG capsule            Health Maintenance Topics with due status: Not Due        Topic Last Completion Date    Lipid Panel 01/04/2021    Hemoglobin A1c (Diabetic Prevention Screening) 01/05/2021       Future Appointments   Date Time Provider Department Center   5/4/2023  3:00 PM ROYER Head [facility]   5/22/2023  8:30 AM Dmitri Stover MD [facility]        Patient Instructions   Take full course of antibiotics until completed. Return in 48-72 hours if your symptoms do not improve and we can recheck your ear to see if we need to change antibiotics.   Follow up if symptoms worsen or fail to improve.     Signature:  ROYER Head      Date of encounter: 5/4/23

## 2023-05-22 DIAGNOSIS — I10 HYPERTENSION, UNSPECIFIED TYPE: ICD-10-CM

## 2023-05-23 ENCOUNTER — OFFICE VISIT (OUTPATIENT)
Dept: FAMILY MEDICINE | Facility: CLINIC | Age: 58
End: 2023-05-23
Payer: MEDICAID

## 2023-05-23 VITALS
HEART RATE: 55 BPM | DIASTOLIC BLOOD PRESSURE: 88 MMHG | BODY MASS INDEX: 37.12 KG/M2 | TEMPERATURE: 98 F | WEIGHT: 231 LBS | OXYGEN SATURATION: 99 % | HEIGHT: 66 IN | SYSTOLIC BLOOD PRESSURE: 142 MMHG

## 2023-05-23 DIAGNOSIS — I10 HYPERTENSION, UNSPECIFIED TYPE: ICD-10-CM

## 2023-05-23 DIAGNOSIS — R35.1 NOCTURIA: ICD-10-CM

## 2023-05-23 DIAGNOSIS — M06.9 RHEUMATOID ARTHRITIS, INVOLVING UNSPECIFIED SITE, UNSPECIFIED WHETHER RHEUMATOID FACTOR PRESENT: Primary | ICD-10-CM

## 2023-05-23 DIAGNOSIS — Z12.11 SCREEN FOR COLON CANCER: ICD-10-CM

## 2023-05-23 DIAGNOSIS — M1A.09X0 IDIOPATHIC CHRONIC GOUT OF MULTIPLE SITES WITHOUT TOPHUS: ICD-10-CM

## 2023-05-23 PROBLEM — J11.1 FLU: Status: RESOLVED | Noted: 2022-12-19 | Resolved: 2023-05-23

## 2023-05-23 PROBLEM — N18.31 STAGE 3A CHRONIC KIDNEY DISEASE: Status: ACTIVE | Noted: 2022-11-21

## 2023-05-23 PROBLEM — U07.1 COVID: Status: RESOLVED | Noted: 2022-12-19 | Resolved: 2023-05-23

## 2023-05-23 PROBLEM — M13.80 OLIGOARTHRITIS: Status: RESOLVED | Noted: 2022-09-07 | Resolved: 2023-05-23

## 2023-05-23 PROBLEM — J02.9 SORE THROAT: Status: RESOLVED | Noted: 2022-09-27 | Resolved: 2023-05-23

## 2023-05-23 PROCEDURE — 3077F SYST BP >= 140 MM HG: CPT | Mod: CPTII,,, | Performed by: STUDENT IN AN ORGANIZED HEALTH CARE EDUCATION/TRAINING PROGRAM

## 2023-05-23 PROCEDURE — 3079F PR MOST RECENT DIASTOLIC BLOOD PRESSURE 80-89 MM HG: ICD-10-PCS | Mod: CPTII,,, | Performed by: STUDENT IN AN ORGANIZED HEALTH CARE EDUCATION/TRAINING PROGRAM

## 2023-05-23 PROCEDURE — 3008F BODY MASS INDEX DOCD: CPT | Mod: CPTII,,, | Performed by: STUDENT IN AN ORGANIZED HEALTH CARE EDUCATION/TRAINING PROGRAM

## 2023-05-23 PROCEDURE — 3008F PR BODY MASS INDEX (BMI) DOCUMENTED: ICD-10-PCS | Mod: CPTII,,, | Performed by: STUDENT IN AN ORGANIZED HEALTH CARE EDUCATION/TRAINING PROGRAM

## 2023-05-23 PROCEDURE — 99215 OFFICE O/P EST HI 40 MIN: CPT | Mod: ,,, | Performed by: STUDENT IN AN ORGANIZED HEALTH CARE EDUCATION/TRAINING PROGRAM

## 2023-05-23 PROCEDURE — 99215 PR OFFICE/OUTPT VISIT, EST, LEVL V, 40-54 MIN: ICD-10-PCS | Mod: ,,, | Performed by: STUDENT IN AN ORGANIZED HEALTH CARE EDUCATION/TRAINING PROGRAM

## 2023-05-23 PROCEDURE — 3077F PR MOST RECENT SYSTOLIC BLOOD PRESSURE >= 140 MM HG: ICD-10-PCS | Mod: CPTII,,, | Performed by: STUDENT IN AN ORGANIZED HEALTH CARE EDUCATION/TRAINING PROGRAM

## 2023-05-23 PROCEDURE — 1160F RVW MEDS BY RX/DR IN RCRD: CPT | Mod: CPTII,,, | Performed by: STUDENT IN AN ORGANIZED HEALTH CARE EDUCATION/TRAINING PROGRAM

## 2023-05-23 PROCEDURE — 1160F PR REVIEW ALL MEDS BY PRESCRIBER/CLIN PHARMACIST DOCUMENTED: ICD-10-PCS | Mod: CPTII,,, | Performed by: STUDENT IN AN ORGANIZED HEALTH CARE EDUCATION/TRAINING PROGRAM

## 2023-05-23 PROCEDURE — 1159F PR MEDICATION LIST DOCUMENTED IN MEDICAL RECORD: ICD-10-PCS | Mod: CPTII,,, | Performed by: STUDENT IN AN ORGANIZED HEALTH CARE EDUCATION/TRAINING PROGRAM

## 2023-05-23 PROCEDURE — 3079F DIAST BP 80-89 MM HG: CPT | Mod: CPTII,,, | Performed by: STUDENT IN AN ORGANIZED HEALTH CARE EDUCATION/TRAINING PROGRAM

## 2023-05-23 PROCEDURE — 1159F MED LIST DOCD IN RCRD: CPT | Mod: CPTII,,, | Performed by: STUDENT IN AN ORGANIZED HEALTH CARE EDUCATION/TRAINING PROGRAM

## 2023-05-23 RX ORDER — ALLOPURINOL 100 MG/1
100 TABLET ORAL DAILY
Qty: 30 TABLET | Refills: 5 | Status: SHIPPED | OUTPATIENT
Start: 2023-05-23

## 2023-05-23 RX ORDER — HYDROCHLOROTHIAZIDE 12.5 MG/1
12.5 CAPSULE ORAL DAILY
Qty: 30 CAPSULE | Refills: 5 | Status: SHIPPED | OUTPATIENT
Start: 2023-05-23 | End: 2023-12-13 | Stop reason: SDUPTHER

## 2023-05-23 NOTE — PROGRESS NOTES
"Subjective     Patient ID: Clay Curtis is a 57 y.o. male.    Chief Complaint: Follow-up and Hypertension    HPI      57-year-old man with the medical problems listed below who comes to clinic for refill of their medication and follow-up for their chronic medical conditions. He has been feeling well with few concerns.       Reports he has been out of HCTZ for 2 days.    Has not seen cardiology for follow-up in order to do clearance for colonoscopy; Unclear if cards did sent clearance to GI, but it seems they want a nuclear stress test.     Saw [facility] in January. Has follow-up that needs to be schedules; he plans on doing this.     Flomax - not taking it every day; encouraged daily adherence to this.      Still taking 10 mg prednisone; also taking enbrel.       Objective     BP (!) 142/88   Pulse (!) 55   Temp 98.1 °F (36.7 °C) (Oral)   Ht 5' 6" (1.676 m)   Wt 104.8 kg (231 lb)   SpO2 99%   BMI 37.28 kg/m²       Wt Readings from Last 3 Encounters:   05/23/23 0852 104.8 kg (231 lb)   05/04/23 1436 101.6 kg (224 lb)   02/22/23 0834 97.5 kg (215 lb)         Physical Exam      Gen: well-groomed, well-dressed. No apparent distress  HEENT:  NCAT, symmetric  Pulm: normal work of breathing, no accessory muscle use; speaking complete sentences without having to stop  Neuro: CN II-XII grossly normal   Psych: pleasant affect, congruent mood. Linear thought; good eye contact  SKIN: no rashes present on face, neck, hands       Assessment and Plan     Problem List Items Addressed This Visit       Hypertension    Relevant Medications    hydroCHLOROthiazide (MICROZIDE) 12.5 mg capsule    Rheumatoid arthritis - Primary    Screen for colon cancer    Nocturia    Idiopathic chronic gout of multiple sites without tophus    Relevant Medications    allopurinoL (ZYLOPRIM) 100 MG tablet          1. Elevated, but he is out of HCTZ; refill today, monitor at home, and continue current medical regimen for now.    2. Continue " enbrel, prednisone; good disease control for now. Encouraged him to schedule f/u with rheum to discuss prednisone taper (has gained over 15 lbs the last several months). He has fortunately tolerated a recent taper from 20 -> 10 mg daily since January (had previously not done well with prednisone taper), and his pain is controlled with prn Tylenol.    3. Discussed with [facility] - recommended for nuclear stress test prior to clearance. Encouraged him to follow up.    4. Symptoms reasonably well-controlled; encouraged daily adherence to tamsulosin.    5. Refill allopurinol. Counseled on diet. Goal uric acid less than 6. 1/23/23 labs showing 6.5 so very close to goal; continue to monitor but for now The current medical regimen is effective;  continue present plan and medications.    6. Obesity: discussed healthy diet; will continue to discuss alcohol use and monitor for sustained abstinence.       - -    Overall, work on changing your diet to simple foods you can prepare at home. Avoid fried food and pre-packaged food like chips and cookies. Avoid sugary drinks like soda and sweet tea. The best diet is one with lots of whole foods that you can cook - fresh or frozen fruits, fresh or frozen vegetables, whole grains, eggs, beans, and meat.     - -       10 Realistic Ways to Eat Less Processed Food    Processed food is any food item that has been canned, cooked, frozen, pasteurized, or packaged.    You can enjoy many processed foods, including canned vegetables, frozen fruits, and pasteurized dairy products, as part of a healthy diet. However, some highly processed items are loaded with salt, sugar, additives, and preservatives, which can harm your health.    Reducing your intake of these highly processed foods is one of the most effective ways to improve your health and enhance the quality of your diet.    In fact, when people ask me for nutritional advice, cutting down on processed foods is one of the first things I  recommend.    Here are 10 simple, sustainable, and realistic strategies to help you eat less processed food.          1. Keep healthy snacks on hand    If youre running short on time, grabbing a packaged snack on your way out the door may be tempting.    However, keeping your kitchen stocked with plenty of portable, nutritious snacks can make it much easier to make healthy choices on the go.    Some of my favorite healthy snacks include fresh fruit, mixed nuts, edamame, and veggies with hummus.    If you have extra time, you can also prep some simple snacks in advance. Hard-boiled eggs, turkey roll-ups, homemade kale chips, and overnight oats are a few great treats that you can whip up quickly and keep on hand for later.        2. Swap refined grains for whole grains    One of the simplest ways to reduce your intake of processed foods is to start trading them for healthier whole foods.    In particular, you can swap refined grains like white pasta, rice, bread, and tortillas for whole grain alternatives, such as brown rice and whole grain pasta, bread, and tortillas.    Not only are whole grains higher in important nutrients like fiber, but theyve also been shown to protect against conditions like heart disease, diabetes, and certain types of cancer         3. Get creative in the kitchen    If youre feeling adventurous, give your favorite processed foods a healthy twist by recreating them in your kitchen. This gives you complete control of what youre putting on your plate while letting you experiment with interesting new ingredients.    For example, you can make veggie chips by tossing potato, zucchini, turnip, or carrot slices with a bit of olive oil and salt, then baking them until theyre crispy.    Other healthy alternatives to processed foods that you can whip up at home include radha pudding, air-popped popcorn, granola bars, and fruit leather.    Personally, I love trying to recreate meals from my favorite  restaurants at home instead of ordering takeout. In addition to saving money, this makes it easier to eat more whole foods by loading up on ingredients like fruits, veggies, nuts, seeds, and legumes.        4. Drink more water    Sugary beverages like soda, sweet tea, fruit juice, and sports drinks are high in sugar and calories but low in essential nutrients.    Gradually trading these drinks for water throughout the day is a great way to cut back on your intake of processed foods and improve your overall diet quality.    Sparkling or flavored water are two great options if plain water isnt your favorite beverage. Alternatively, you can try infusing water with fresh fruit or herbs for an added burst of flavor.        5. Try meal prepping    Preparing meals in large batches once or twice each week ensures that you have plenty of nutritious meals ready in your fridge even when youre too busy to cook.    It can also make it much less tempting to hit the drive-through on your way home or turn to frozen convenience meals when youre pressed for time.    To get started, pick a few recipes to make each week and set aside a specific time to prepare your meals.    I also prefer finding a few recipes that share similar ingredients so that I can rotate through several meals during the week to avoid repetition.        6. Eat more vegetables    When youre preparing meals at home, include at least one serving of vegetables to increase your intake of healthy, unprocessed foods.    This can be as easy as adding spinach to your scrambled eggs, sautéing broccoli for a simple side dish, or tossing carrots or cauliflower into soups or casseroles.    Vegetables are highly nutritious and great sources of fiber, which keeps you feeling full between meals to help decrease your appetite and curb cravings        7. Switch up your shopping routine    Its much easier to limit your intake of processed foods when you dont have any on  hand.    Next time you go to the grocery store, fill your cart up with healthy, minimally processed ingredients like fruits, vegetables, whole grains, and legumes.    You can also try sticking to the perimeter of the store and avoiding the middle aisles, which is where processed snacks and junk foods are typically found.        8. Try some simple food swaps    There are countless healthy swaps for many processed products. Here are a few of my favorites:    Trade your sugary breakfast cereal for a bowl of oatmeal with fresh fruit.  Pop your own popcorn on the stove in place of microwave popcorn.  Whip up a homemade vinaigrette with olive oil and vinegar to drizzle over salads in place of processed dressings.  Make trail mix using nuts, seeds, and dried fruit for a healthy alternative to store-bought varieties.  Top your salads with nuts or seeds instead of croutons.    Be sure to read labels on your favorite food products when youre shopping. Whenever possible, steer clear of foods with lots of sodium, trans fat, or added sugar.        9. Eat less processed meat    Processed meats like traore, sausage, lunch meat, and hot dogs are associated with several downsides and even classified as carcinogenic by the International Agency for Research on Cancer (4).    Youll be glad to hear that there are plenty of easy ways to cut back on processed meat.    For starters, you can simply swap these foods for less processed varieties of meat, such as fresh chicken, salmon, or turkey. You can also replace packaged lunch meats with other sandwich fillings, including tuna salad, chicken breast, or hard-boiled eggs.    Alternatively, you can eat more plant-based proteins, such as beans, lentils, tofu, or tempeh.        10. Make changes slowly      Theres no need to completely eliminate processed foods from your diet all at once.    In fact, making changes slowly is often more effective and sustainable in the long run. Some research  suggests that minor lifestyle changes help form long-lasting habits and make actions that are initially difficult much easier over time ([employer]).    Each week, try experimenting with one or two of the strategies listed above, then gradually implement more.        The bottom line    Processed foods are any food that has been cooked, canned, frozen, or packaged.    Although you can eat numerous processed foods as part of a healthy diet, you should limit those that are high in sodium, sugar, additives, and preservatives.    Try a few of the tips outlined in this article to find what works for you, and remember to make changes slowly for the best results.    Keep in mind that you can still enjoy dining out or eating processed foods in moderation as part of a healthy, balanced diet.      - -     Face to face encounter time 20 minutes.    Non face to face encounter time 20 minutes.  Reviewing separate obtained history, counseling and educating the patient, family and/or other caregivers, ordering medications, tests or procedures; referring and communicating with other health care professionals; documenting clinical information in the electronic medical record; care coordination; independently interpreting results and communicating results to the patient, family, and/or caregivers.    Total time for visit  40 minutes

## 2023-05-23 NOTE — PATIENT INSTRUCTIONS
Overall, work on changing your diet to simple foods you can prepare at home. Avoid fried food and pre-packaged food like chips and cookies. Avoid sugary drinks like soda and sweet tea. The best diet is one with lots of whole foods that you can cook - fresh or frozen fruits, fresh or frozen vegetables, whole grains, eggs, beans, and meat.     - -       10 Realistic Ways to Eat Less Processed Food    Processed food is any food item that has been canned, cooked, frozen, pasteurized, or packaged.    You can enjoy many processed foods, including canned vegetables, frozen fruits, and pasteurized dairy products, as part of a healthy diet. However, some highly processed items are loaded with salt, sugar, additives, and preservatives, which can harm your health.    Reducing your intake of these highly processed foods is one of the most effective ways to improve your health and enhance the quality of your diet.    In fact, when people ask me for nutritional advice, cutting down on processed foods is one of the first things I recommend.    Here are 10 simple, sustainable, and realistic strategies to help you eat less processed food.          1. Keep healthy snacks on hand    If youre running short on time, grabbing a packaged snack on your way out the door may be tempting.    However, keeping your kitchen stocked with plenty of portable, nutritious snacks can make it much easier to make healthy choices on the go.    Some of my favorite healthy snacks include fresh fruit, mixed nuts, edamame, and veggies with hummus.    If you have extra time, you can also prep some simple snacks in advance. Hard-boiled eggs, turkey roll-ups, homemade kale chips, and overnight oats are a few great treats that you can whip up quickly and keep on hand for later.        2. Swap refined grains for whole grains    One of the simplest ways to reduce your intake of processed foods is to start trading them for healthier whole foods.    In particular,  you can swap refined grains like white pasta, rice, bread, and tortillas for whole grain alternatives, such as brown rice and whole grain pasta, bread, and tortillas.    Not only are whole grains higher in important nutrients like fiber, but theyve also been shown to protect against conditions like heart disease, diabetes, and certain types of cancer         3. Get creative in the kitchen    If youre feeling adventurous, give your favorite processed foods a healthy twist by recreating them in your kitchen. This gives you complete control of what youre putting on your plate while letting you experiment with interesting new ingredients.    For example, you can make veggie chips by tossing potato, zucchini, turnip, or carrot slices with a bit of olive oil and salt, then baking them until theyre crispy.    Other healthy alternatives to processed foods that you can whip up at home include radha pudding, air-popped popcorn, granola bars, and fruit leather.    Personally, I love trying to recreate meals from my favorite restaurants at home instead of ordering takeout. In addition to saving money, this makes it easier to eat more whole foods by loading up on ingredients like fruits, veggies, nuts, seeds, and legumes.        4. Drink more water    Sugary beverages like soda, sweet tea, fruit juice, and sports drinks are high in sugar and calories but low in essential nutrients.    Gradually trading these drinks for water throughout the day is a great way to cut back on your intake of processed foods and improve your overall diet quality.    Sparkling or flavored water are two great options if plain water isnt your favorite beverage. Alternatively, you can try infusing water with fresh fruit or herbs for an added burst of flavor.        5. Try meal prepping    Preparing meals in large batches once or twice each week ensures that you have plenty of nutritious meals ready in your fridge even when youre too busy to  cook.    It can also make it much less tempting to hit the drive-through on your way home or turn to frozen convenience meals when youre pressed for time.    To get started, pick a few recipes to make each week and set aside a specific time to prepare your meals.    I also prefer finding a few recipes that share similar ingredients so that I can rotate through several meals during the week to avoid repetition.        6. Eat more vegetables    When youre preparing meals at home, include at least one serving of vegetables to increase your intake of healthy, unprocessed foods.    This can be as easy as adding spinach to your scrambled eggs, sautéing broccoli for a simple side dish, or tossing carrots or cauliflower into soups or casseroles.    Vegetables are highly nutritious and great sources of fiber, which keeps you feeling full between meals to help decrease your appetite and curb cravings        7. Switch up your shopping routine    Its much easier to limit your intake of processed foods when you dont have any on hand.    Next time you go to the grocery store, fill your cart up with healthy, minimally processed ingredients like fruits, vegetables, whole grains, and legumes.    You can also try sticking to the perimeter of the store and avoiding the middle aisles, which is where processed snacks and junk foods are typically found.        8. Try some simple food swaps    There are countless healthy swaps for many processed products. Here are a few of my favorites:    Trade your sugary breakfast cereal for a bowl of oatmeal with fresh fruit.  Pop your own popcorn on the stove in place of microwave popcorn.  Whip up a homemade vinaigrette with olive oil and vinegar to drizzle over salads in place of processed dressings.  Make trail mix using nuts, seeds, and dried fruit for a healthy alternative to store-bought varieties.  Top your salads with nuts or seeds instead of croutons.    Be sure to read labels on your  favorite food products when youre shopping. Whenever possible, steer clear of foods with lots of sodium, trans fat, or added sugar.        9. Eat less processed meat    Processed meats like traore, sausage, lunch meat, and hot dogs are associated with several downsides and even classified as carcinogenic by the International Agency for Research on Cancer (4).    Youll be glad to hear that there are plenty of easy ways to cut back on processed meat.    For starters, you can simply swap these foods for less processed varieties of meat, such as fresh chicken, salmon, or turkey. You can also replace packaged lunch meats with other sandwich fillings, including tuna salad, chicken breast, or hard-boiled eggs.    Alternatively, you can eat more plant-based proteins, such as beans, lentils, tofu, or tempeh.        10. Make changes slowly      Theres no need to completely eliminate processed foods from your diet all at once.    In fact, making changes slowly is often more effective and sustainable in the long run. Some research suggests that minor lifestyle changes help form long-lasting habits and make actions that are initially difficult much easier over time ([employer]).    Each week, try experimenting with one or two of the strategies listed above, then gradually implement more.        The bottom line    Processed foods are any food that has been cooked, canned, frozen, or packaged.    Although you can eat numerous processed foods as part of a healthy diet, you should limit those that are high in sodium, sugar, additives, and preservatives.    Try a few of the tips outlined in this article to find what works for you, and remember to make changes slowly for the best results.    Keep in mind that you can still enjoy dining out or eating processed foods in moderation as part of a healthy, balanced diet.

## 2023-05-24 ENCOUNTER — DOCUMENTATION ONLY (OUTPATIENT)
Dept: FAMILY MEDICINE | Facility: CLINIC | Age: 58
End: 2023-05-24
Payer: MEDICAID

## 2023-05-25 RX ORDER — HYDROCHLOROTHIAZIDE 12.5 MG/1
CAPSULE ORAL
Qty: 30 CAPSULE | Refills: 1 | OUTPATIENT
Start: 2023-05-25

## 2023-06-29 LAB — CRC RECOMMENDATION EXT: NORMAL

## 2023-06-30 ENCOUNTER — PATIENT OUTREACH (OUTPATIENT)
Dept: ADMINISTRATIVE | Facility: HOSPITAL | Age: 58
End: 2023-06-30

## 2023-06-30 NOTE — PROGRESS NOTES
Uploaded pt colonoscopy from [facility]. LIKELY to repeat in 10 year, waiting pathology results

## 2023-07-26 ENCOUNTER — PATIENT OUTREACH (OUTPATIENT)
Dept: ADMINISTRATIVE | Facility: HOSPITAL | Age: 58
End: 2023-07-26

## 2023-07-26 NOTE — LETTER
AUTHORIZATION FOR RELEASE OF   CONFIDENTIAL INFORMATION    Dear ASIA Robbins,    We are seeing Clay Curtis, date of birth 1965, in the clinic at [facility]. Dmitri Stover MD is the patient's PCP. Clay Curtis has an outstanding lab/procedure at the time we reviewed his chart. In order to help keep his health information updated, he has authorized us to request the following medical record(s):        (  )  MAMMOGRAM                                      (  )  COLONOSCOPY      (  )  PAP SMEAR                                          (  )  OUTSIDE LAB RESULTS     (  )  DEXA SCAN                                          (  )  EYE EXAM            (  )  FOOT EXAM                                          (  )  ENTIRE RECORD     (  )  OUTSIDE IMMUNIZATIONS                 (X)   Colon Path          Please fax records to [facility]inator, Keisha Tirado, [phone].     If you have any questions, please contact [phone].        Patient Name: Clay Curtis  : 1965  Patient Phone #: [phone]

## 2023-07-27 ENCOUNTER — PATIENT OUTREACH (OUTPATIENT)
Dept: ADMINISTRATIVE | Facility: HOSPITAL | Age: 58
End: 2023-07-27

## 2023-09-15 ENCOUNTER — OFFICE VISIT (OUTPATIENT)
Dept: FAMILY MEDICINE | Facility: CLINIC | Age: 58
End: 2023-09-15
Payer: MEDICAID

## 2023-09-15 VITALS
OXYGEN SATURATION: 96 % | WEIGHT: 237 LBS | SYSTOLIC BLOOD PRESSURE: 165 MMHG | BODY MASS INDEX: 38.09 KG/M2 | DIASTOLIC BLOOD PRESSURE: 90 MMHG | TEMPERATURE: 99 F | HEART RATE: 80 BPM | HEIGHT: 66 IN

## 2023-09-15 DIAGNOSIS — I10 SEVERE UNCONTROLLED HYPERTENSION: Primary | ICD-10-CM

## 2023-09-15 PROCEDURE — 3008F PR BODY MASS INDEX (BMI) DOCUMENTED: ICD-10-PCS | Mod: CPTII,,, | Performed by: STUDENT IN AN ORGANIZED HEALTH CARE EDUCATION/TRAINING PROGRAM

## 2023-09-15 PROCEDURE — 3008F BODY MASS INDEX DOCD: CPT | Mod: CPTII,,, | Performed by: STUDENT IN AN ORGANIZED HEALTH CARE EDUCATION/TRAINING PROGRAM

## 2023-09-15 PROCEDURE — 1159F MED LIST DOCD IN RCRD: CPT | Mod: CPTII,,, | Performed by: STUDENT IN AN ORGANIZED HEALTH CARE EDUCATION/TRAINING PROGRAM

## 2023-09-15 PROCEDURE — 1160F RVW MEDS BY RX/DR IN RCRD: CPT | Mod: CPTII,,, | Performed by: STUDENT IN AN ORGANIZED HEALTH CARE EDUCATION/TRAINING PROGRAM

## 2023-09-15 PROCEDURE — 3080F DIAST BP >= 90 MM HG: CPT | Mod: CPTII,,, | Performed by: STUDENT IN AN ORGANIZED HEALTH CARE EDUCATION/TRAINING PROGRAM

## 2023-09-15 PROCEDURE — 99214 PR OFFICE/OUTPT VISIT, EST, LEVL IV, 30-39 MIN: ICD-10-PCS | Mod: ,,, | Performed by: STUDENT IN AN ORGANIZED HEALTH CARE EDUCATION/TRAINING PROGRAM

## 2023-09-15 PROCEDURE — 1160F PR REVIEW ALL MEDS BY PRESCRIBER/CLIN PHARMACIST DOCUMENTED: ICD-10-PCS | Mod: CPTII,,, | Performed by: STUDENT IN AN ORGANIZED HEALTH CARE EDUCATION/TRAINING PROGRAM

## 2023-09-15 PROCEDURE — 3080F PR MOST RECENT DIASTOLIC BLOOD PRESSURE >= 90 MM HG: ICD-10-PCS | Mod: CPTII,,, | Performed by: STUDENT IN AN ORGANIZED HEALTH CARE EDUCATION/TRAINING PROGRAM

## 2023-09-15 PROCEDURE — 1159F PR MEDICATION LIST DOCUMENTED IN MEDICAL RECORD: ICD-10-PCS | Mod: CPTII,,, | Performed by: STUDENT IN AN ORGANIZED HEALTH CARE EDUCATION/TRAINING PROGRAM

## 2023-09-15 PROCEDURE — 3077F PR MOST RECENT SYSTOLIC BLOOD PRESSURE >= 140 MM HG: ICD-10-PCS | Mod: CPTII,,, | Performed by: STUDENT IN AN ORGANIZED HEALTH CARE EDUCATION/TRAINING PROGRAM

## 2023-09-15 PROCEDURE — 99214 OFFICE O/P EST MOD 30 MIN: CPT | Mod: ,,, | Performed by: STUDENT IN AN ORGANIZED HEALTH CARE EDUCATION/TRAINING PROGRAM

## 2023-09-15 PROCEDURE — 3077F SYST BP >= 140 MM HG: CPT | Mod: CPTII,,, | Performed by: STUDENT IN AN ORGANIZED HEALTH CARE EDUCATION/TRAINING PROGRAM

## 2023-09-15 RX ORDER — HYDRALAZINE HYDROCHLORIDE 10 MG/1
10 TABLET, FILM COATED ORAL 3 TIMES DAILY
Qty: 90 TABLET | Refills: 11 | Status: SHIPPED | OUTPATIENT
Start: 2023-09-15 | End: 2023-10-05 | Stop reason: SDUPTHER

## 2023-09-15 NOTE — PROGRESS NOTES
"Subjective     Patient ID: Clay Curtis is a 58 y.o. male.    Chief Complaint: Consult (Kidney function)    HPI      58-year-old man with the medical problems listed below who comes to clinic with uncontrolled hypertension. He was recently at his rheumatologist's office where markedly elevated blood pressure was recorded; they also noted an interval worsening in serum creatinine.    He has self-d/c'd his BP regimen, which previously included the following:     Amlodipine 5 mg daily  HCTZ 12.5 daily    He is also not taking flomax.    Glorioso note :    Continue Xeljanz 5mg BID. He has been on it for about weeks and already feeling about 60% improved  We can hold the MTX for now. He would like to see if he can manage on Xeljanz alone. Time will tell as he tapers prednisone. He does understand that we may need to add back the MTX if his symptoms return.  Continue prednisone 10 mg daily for now  Continue TylenolContinue Xeljanz 5mg BID. He has been on it for about weeks and already feeling about 60% improved  We can hold the MTX for now. He would like to see if he can manage on Xeljanz alone. Time will tell as he tapers prednisone. He does understand that we may need to add back the MTX if his symptoms return.  Continue prednisone 10 mg daily for now  Continue Tylenol    sCr 1.64     Objective     BP (!) 165/90   Pulse 80   Temp 98.6 °F (37 °C) (Oral)   Ht 5' 6" (1.676 m)   Wt 107.5 kg (237 lb)   SpO2 96%   BMI 38.25 kg/m²     Wt Readings from Last 3 Encounters:   09/15/23 1350 107.5 kg (237 lb)   05/23/23 0852 104.8 kg (231 lb)   05/04/23 1436 101.6 kg (224 lb)       Physical Exam    Gen: well-groomed, well-dressed. No apparent distress  HEENT:  NCAT, symmetric  Pulm: normal work of breathing, no accessory muscle use; speaking complete sentences without having to stop  Neuro: CN II-XII grossly normal   Psych: pleasant affect, congruent mood. Linear thought; good eye contact  SKIN: no rashes present on face, " neck, hands       Assessment and Plan     Problem List Items Addressed This Visit    None  Visit Diagnoses       Severe uncontrolled hypertension    -  Primary    Relevant Medications    hydrALAZINE (APRESOLINE) 10 MG tablet    Other Relevant Orders    Basic Metabolic Panel          Need to control BP; He recently started back taking his amlodipine and HCTZ but BP remains markedly elevated; will  add on hydral for goal <130/80 eventually. Will monitor kidney function and consider switching to RAAS blockade (and stopping hydral)    Overall, need to really focus on weight management in light of his chronic steroid use.     Face to face encounter time 10 minutes.    Non face to face encounter time 20 minutes.  Reviewing separate obtained history, counseling and educating the patient, family and/or other caregivers, ordering medications, tests or procedures; referring and communicating with other health care professionals; documenting clinical information in the electronic medical record; care coordination; independently interpreting results and communicating results to the patient, family, and/or caregivers.    Total time for visit  30 minutes

## 2023-09-15 NOTE — PATIENT INSTRUCTIONS
Check your blood pressure every day and write it down  Start taking your new medicine along with your amlodipine and hydrochlorothiazide  Come back in 1 week for blood pressure recheck and labs

## 2023-09-20 ENCOUNTER — LAB VISIT (OUTPATIENT)
Dept: LAB | Facility: HOSPITAL | Age: 58
End: 2023-09-20
Attending: STUDENT IN AN ORGANIZED HEALTH CARE EDUCATION/TRAINING PROGRAM
Payer: MEDICAID

## 2023-09-20 ENCOUNTER — OFFICE VISIT (OUTPATIENT)
Dept: FAMILY MEDICINE | Facility: CLINIC | Age: 58
End: 2023-09-20
Payer: MEDICAID

## 2023-09-20 VITALS
TEMPERATURE: 98 F | WEIGHT: 238 LBS | OXYGEN SATURATION: 97 % | BODY MASS INDEX: 38.25 KG/M2 | HEIGHT: 66 IN | HEART RATE: 81 BPM | DIASTOLIC BLOOD PRESSURE: 99 MMHG | SYSTOLIC BLOOD PRESSURE: 148 MMHG

## 2023-09-20 DIAGNOSIS — I10 SEVERE UNCONTROLLED HYPERTENSION: ICD-10-CM

## 2023-09-20 DIAGNOSIS — I10 SEVERE UNCONTROLLED HYPERTENSION: Primary | ICD-10-CM

## 2023-09-20 LAB
ANION GAP SERPL CALCULATED.3IONS-SCNC: 11 MMOL/L (ref 7–16)
BUN SERPL-MCNC: 32 MG/DL (ref 7–18)
BUN/CREAT SERPL: 18 (ref 6–20)
CALCIUM SERPL-MCNC: 9.3 MG/DL (ref 8.5–10.1)
CHLORIDE SERPL-SCNC: 100 MMOL/L (ref 98–107)
CO2 SERPL-SCNC: 32 MMOL/L (ref 21–32)
CREAT SERPL-MCNC: 1.78 MG/DL (ref 0.7–1.3)
EGFR (NO RACE VARIABLE) (RUSH/TITUS): 44 ML/MIN/1.73M2
GLUCOSE SERPL-MCNC: 127 MG/DL (ref 74–106)
POTASSIUM SERPL-SCNC: 3.7 MMOL/L (ref 3.5–5.1)
SODIUM SERPL-SCNC: 139 MMOL/L (ref 136–145)

## 2023-09-20 PROCEDURE — 1159F PR MEDICATION LIST DOCUMENTED IN MEDICAL RECORD: ICD-10-PCS | Mod: CPTII,,, | Performed by: STUDENT IN AN ORGANIZED HEALTH CARE EDUCATION/TRAINING PROGRAM

## 2023-09-20 PROCEDURE — 3077F PR MOST RECENT SYSTOLIC BLOOD PRESSURE >= 140 MM HG: ICD-10-PCS | Mod: CPTII,,, | Performed by: STUDENT IN AN ORGANIZED HEALTH CARE EDUCATION/TRAINING PROGRAM

## 2023-09-20 PROCEDURE — 3077F SYST BP >= 140 MM HG: CPT | Mod: CPTII,,, | Performed by: STUDENT IN AN ORGANIZED HEALTH CARE EDUCATION/TRAINING PROGRAM

## 2023-09-20 PROCEDURE — 1160F PR REVIEW ALL MEDS BY PRESCRIBER/CLIN PHARMACIST DOCUMENTED: ICD-10-PCS | Mod: CPTII,,, | Performed by: STUDENT IN AN ORGANIZED HEALTH CARE EDUCATION/TRAINING PROGRAM

## 2023-09-20 PROCEDURE — 3008F BODY MASS INDEX DOCD: CPT | Mod: CPTII,,, | Performed by: STUDENT IN AN ORGANIZED HEALTH CARE EDUCATION/TRAINING PROGRAM

## 2023-09-20 PROCEDURE — 80048 BASIC METABOLIC PNL TOTAL CA: CPT

## 2023-09-20 PROCEDURE — 3008F PR BODY MASS INDEX (BMI) DOCUMENTED: ICD-10-PCS | Mod: CPTII,,, | Performed by: STUDENT IN AN ORGANIZED HEALTH CARE EDUCATION/TRAINING PROGRAM

## 2023-09-20 PROCEDURE — 99213 OFFICE O/P EST LOW 20 MIN: CPT | Mod: ,,, | Performed by: STUDENT IN AN ORGANIZED HEALTH CARE EDUCATION/TRAINING PROGRAM

## 2023-09-20 PROCEDURE — 1159F MED LIST DOCD IN RCRD: CPT | Mod: CPTII,,, | Performed by: STUDENT IN AN ORGANIZED HEALTH CARE EDUCATION/TRAINING PROGRAM

## 2023-09-20 PROCEDURE — 99213 PR OFFICE/OUTPT VISIT, EST, LEVL III, 20-29 MIN: ICD-10-PCS | Mod: ,,, | Performed by: STUDENT IN AN ORGANIZED HEALTH CARE EDUCATION/TRAINING PROGRAM

## 2023-09-20 PROCEDURE — 1160F RVW MEDS BY RX/DR IN RCRD: CPT | Mod: CPTII,,, | Performed by: STUDENT IN AN ORGANIZED HEALTH CARE EDUCATION/TRAINING PROGRAM

## 2023-09-20 PROCEDURE — 3080F PR MOST RECENT DIASTOLIC BLOOD PRESSURE >= 90 MM HG: ICD-10-PCS | Mod: CPTII,,, | Performed by: STUDENT IN AN ORGANIZED HEALTH CARE EDUCATION/TRAINING PROGRAM

## 2023-09-20 PROCEDURE — 3080F DIAST BP >= 90 MM HG: CPT | Mod: CPTII,,, | Performed by: STUDENT IN AN ORGANIZED HEALTH CARE EDUCATION/TRAINING PROGRAM

## 2023-09-20 PROCEDURE — 36415 COLL VENOUS BLD VENIPUNCTURE: CPT

## 2023-09-20 RX ORDER — ACETAMINOPHEN 500 MG
1 TABLET ORAL DAILY
Qty: 1 EACH | Refills: 0 | Status: SHIPPED | OUTPATIENT
Start: 2023-09-20 | End: 2023-10-05 | Stop reason: SDUPTHER

## 2023-09-20 NOTE — PROGRESS NOTES
"Subjective     Patient ID: Clay Curtis is a 58 y.o. male.    Chief Complaint: Follow-up and Hypertension    HPI    58-year-old man here for follow-up for hypertension - it is still quite elevated. Reports adherence to all his medications including the new Hydral 10 TID - but it giving him headaches when he takes it. It is not so severe that he wants to stop it.    Otherwise feeling well with few concerns.     If kidney function has normalized will start ARB and taper off hydral.    Bp cuff sent in today.     Objective     BP (!) 148/99   Pulse 81   Temp 98.4 °F (36.9 °C) (Oral)   Ht 5' 6" (1.676 m)   Wt 108 kg (238 lb)   SpO2 97%   BMI 38.41 kg/m²     Physical Exam      Gen: well-groomed, well-dressed. No apparent distress  HEENT:  NCAT, symmetric  Pulm: normal work of breathing, no accessory muscle use; speaking complete sentences without having to stop  Neuro: CN II-XII grossly normal   Psych: pleasant affect, congruent mood. Linear thought; good eye contact  SKIN: no rashes present on face, neck, hands       Assessment and Plan     Problem List Items Addressed This Visit    None  Visit Diagnoses       Severe uncontrolled hypertension    -  Primary    Relevant Medications    blood pressure monitor Kit          58-year-old man here for follow-up for hypertension - it is still quite elevated. Reports adherence to all his medications including the new Hydral 10 TID - but it giving him headaches when he takes it. It is not so severe that he wants to stop it.    If kidney function has normalized will start ARB and taper off hydral.    Bp cuff sent in today.    RTC 2 weeks        "

## 2023-09-21 ENCOUNTER — TELEPHONE (OUTPATIENT)
Dept: FAMILY MEDICINE | Facility: CLINIC | Age: 58
End: 2023-09-21
Payer: MEDICAID

## 2023-09-21 NOTE — TELEPHONE ENCOUNTER
----- Message from Dmitri Stover MD sent at 9/21/2023  9:51 AM CDT -----  Please contact the patient and let them know that their kidney function is a little worse. We cannot start the new medication yet.     He should increase his amlodipine to 10 mg daily (2 tablets daily) in addition to the hydrochlorothiazide 12.5 mg daily. Also continue his hydralazine 3 times daily.    Please encourage him to hydrate really well as it looks like he is not getting enough water as well. We will recheck his kidney function in 2 more weeks. I believe if he makes these changes we will be able to start the new medication next time and come down off this hydralazine.

## 2023-10-04 ENCOUNTER — OFFICE VISIT (OUTPATIENT)
Dept: FAMILY MEDICINE | Facility: CLINIC | Age: 58
End: 2023-10-04
Payer: MEDICAID

## 2023-10-04 VITALS
HEIGHT: 66 IN | DIASTOLIC BLOOD PRESSURE: 95 MMHG | BODY MASS INDEX: 38.25 KG/M2 | TEMPERATURE: 98 F | WEIGHT: 238 LBS | SYSTOLIC BLOOD PRESSURE: 149 MMHG | OXYGEN SATURATION: 97 % | HEART RATE: 87 BPM

## 2023-10-04 DIAGNOSIS — L72.3 SEBACEOUS CYST: ICD-10-CM

## 2023-10-04 DIAGNOSIS — I10 SEVERE UNCONTROLLED HYPERTENSION: Primary | ICD-10-CM

## 2023-10-04 DIAGNOSIS — R73.9 HYPERGLYCEMIA: ICD-10-CM

## 2023-10-04 LAB
ANION GAP SERPL CALCULATED.3IONS-SCNC: 6 MMOL/L (ref 7–16)
BUN SERPL-MCNC: 26 MG/DL (ref 7–18)
BUN/CREAT SERPL: 13 (ref 6–20)
CALCIUM SERPL-MCNC: 9.3 MG/DL (ref 8.5–10.1)
CHLORIDE SERPL-SCNC: 105 MMOL/L (ref 98–107)
CO2 SERPL-SCNC: 30 MMOL/L (ref 21–32)
CREAT SERPL-MCNC: 1.95 MG/DL (ref 0.7–1.3)
EGFR (NO RACE VARIABLE) (RUSH/TITUS): 39 ML/MIN/1.73M2
EST. AVERAGE GLUCOSE BLD GHB EST-MCNC: 120 MG/DL
GLUCOSE SERPL-MCNC: 144 MG/DL (ref 74–106)
HBA1C MFR BLD HPLC: 6.2 % (ref 4.5–6.6)
POTASSIUM SERPL-SCNC: 3.4 MMOL/L (ref 3.5–5.1)
SODIUM SERPL-SCNC: 138 MMOL/L (ref 136–145)

## 2023-10-04 PROCEDURE — 3044F HG A1C LEVEL LT 7.0%: CPT | Mod: CPTII,,, | Performed by: STUDENT IN AN ORGANIZED HEALTH CARE EDUCATION/TRAINING PROGRAM

## 2023-10-04 PROCEDURE — 1159F PR MEDICATION LIST DOCUMENTED IN MEDICAL RECORD: ICD-10-PCS | Mod: CPTII,,, | Performed by: STUDENT IN AN ORGANIZED HEALTH CARE EDUCATION/TRAINING PROGRAM

## 2023-10-04 PROCEDURE — 83036 HEMOGLOBIN A1C: ICD-10-PCS | Mod: ,,, | Performed by: CLINICAL MEDICAL LABORATORY

## 2023-10-04 PROCEDURE — 1159F MED LIST DOCD IN RCRD: CPT | Mod: CPTII,,, | Performed by: STUDENT IN AN ORGANIZED HEALTH CARE EDUCATION/TRAINING PROGRAM

## 2023-10-04 PROCEDURE — 80048 BASIC METABOLIC PNL TOTAL CA: CPT | Mod: ,,, | Performed by: CLINICAL MEDICAL LABORATORY

## 2023-10-04 PROCEDURE — 3077F PR MOST RECENT SYSTOLIC BLOOD PRESSURE >= 140 MM HG: ICD-10-PCS | Mod: CPTII,,, | Performed by: STUDENT IN AN ORGANIZED HEALTH CARE EDUCATION/TRAINING PROGRAM

## 2023-10-04 PROCEDURE — 1160F RVW MEDS BY RX/DR IN RCRD: CPT | Mod: CPTII,,, | Performed by: STUDENT IN AN ORGANIZED HEALTH CARE EDUCATION/TRAINING PROGRAM

## 2023-10-04 PROCEDURE — 3008F BODY MASS INDEX DOCD: CPT | Mod: CPTII,,, | Performed by: STUDENT IN AN ORGANIZED HEALTH CARE EDUCATION/TRAINING PROGRAM

## 2023-10-04 PROCEDURE — 3077F SYST BP >= 140 MM HG: CPT | Mod: CPTII,,, | Performed by: STUDENT IN AN ORGANIZED HEALTH CARE EDUCATION/TRAINING PROGRAM

## 2023-10-04 PROCEDURE — 1160F PR REVIEW ALL MEDS BY PRESCRIBER/CLIN PHARMACIST DOCUMENTED: ICD-10-PCS | Mod: CPTII,,, | Performed by: STUDENT IN AN ORGANIZED HEALTH CARE EDUCATION/TRAINING PROGRAM

## 2023-10-04 PROCEDURE — 3008F PR BODY MASS INDEX (BMI) DOCUMENTED: ICD-10-PCS | Mod: CPTII,,, | Performed by: STUDENT IN AN ORGANIZED HEALTH CARE EDUCATION/TRAINING PROGRAM

## 2023-10-04 PROCEDURE — 3080F PR MOST RECENT DIASTOLIC BLOOD PRESSURE >= 90 MM HG: ICD-10-PCS | Mod: CPTII,,, | Performed by: STUDENT IN AN ORGANIZED HEALTH CARE EDUCATION/TRAINING PROGRAM

## 2023-10-04 PROCEDURE — 99214 OFFICE O/P EST MOD 30 MIN: CPT | Mod: ,,, | Performed by: STUDENT IN AN ORGANIZED HEALTH CARE EDUCATION/TRAINING PROGRAM

## 2023-10-04 PROCEDURE — 3044F PR MOST RECENT HEMOGLOBIN A1C LEVEL <7.0%: ICD-10-PCS | Mod: CPTII,,, | Performed by: STUDENT IN AN ORGANIZED HEALTH CARE EDUCATION/TRAINING PROGRAM

## 2023-10-04 PROCEDURE — 80048 BASIC METABOLIC PANEL: ICD-10-PCS | Mod: ,,, | Performed by: CLINICAL MEDICAL LABORATORY

## 2023-10-04 PROCEDURE — 83036 HEMOGLOBIN GLYCOSYLATED A1C: CPT | Mod: ,,, | Performed by: CLINICAL MEDICAL LABORATORY

## 2023-10-04 PROCEDURE — 3080F DIAST BP >= 90 MM HG: CPT | Mod: CPTII,,, | Performed by: STUDENT IN AN ORGANIZED HEALTH CARE EDUCATION/TRAINING PROGRAM

## 2023-10-04 PROCEDURE — 99214 PR OFFICE/OUTPT VISIT, EST, LEVL IV, 30-39 MIN: ICD-10-PCS | Mod: ,,, | Performed by: STUDENT IN AN ORGANIZED HEALTH CARE EDUCATION/TRAINING PROGRAM

## 2023-10-04 NOTE — PROGRESS NOTES
"Subjective     Patient ID: Clay Curtis is a 58 y.o. male.    Chief Complaint: Follow-up and Cyst ( Left side of neck)    HPI    58-year-old man with the medical problems listed below who comes to clinic with the medical problems listed below who comes to clinic for HTN follow-up.    Hydral no longer causing headaches. BP remains elevated. Did increase his amlodipine       Objective     BP (!) 149/95   Pulse 87   Temp 98.3 °F (36.8 °C) (Oral)   Ht 5' 6" (1.676 m)   Wt 108 kg (238 lb)   SpO2 97%   BMI 38.41 kg/m²         Physical Exam    Gen: well-groomed, well-dressed. No apparent distress  HEENT:  NCAT, symmetric  Pulm: normal work of breathing, no accessory muscle use; speaking complete sentences without having to stop  Neuro: CN II-XII grossly normal   Psych: pleasant affect, congruent mood. Linear thought; good eye contact  SKIN: no rashes present on face, neck, hands     Assessment and Plan     Problem List Items Addressed This Visit    None  Visit Diagnoses       Severe uncontrolled hypertension    -  Primary    Relevant Orders    Hemoglobin A1C (Completed)    Basic Metabolic Panel (Completed)    Hyperglycemia        Relevant Orders    Hemoglobin A1C (Completed)    Basic Metabolic Panel (Completed)    Sebaceous cyst                Need to increase hydral if GFR not better. Continue HCTZ, amlodipine.  Will check A1c; really just needs weight loss and think a GLP1 RA could help  Postero-lateral neck on the left; has been draining some. Warm compresses. No tenderness or erythema or warmth; do not think is infected. continue to monitor and consider abx or surg / derm referral for definitive management.     Face to face encounter time 15 minutes.    Non face to face encounter time 15 minutes.  Reviewing separate obtained history, counseling and educating the patient, family and/or other caregivers, ordering medications, tests or procedures; referring and communicating with other health care professionals; " documenting clinical information in the electronic medical record; care coordination; independently interpreting results and communicating results to the patient, family, and/or caregivers.    Total time for visit  30 minutes

## 2023-10-05 DIAGNOSIS — I10 SEVERE UNCONTROLLED HYPERTENSION: ICD-10-CM

## 2023-10-05 RX ORDER — ACETAMINOPHEN 500 MG
1 TABLET ORAL DAILY
Qty: 1 EACH | Refills: 0 | Status: SHIPPED | OUTPATIENT
Start: 2023-10-05 | End: 2023-12-13

## 2023-10-05 RX ORDER — MENTHOL, UNSPECIFIED FORM 50 MG/ML
1 GEL TOPICAL 3 TIMES DAILY PRN
Qty: 113.4 G | Refills: 2 | Status: SHIPPED | OUTPATIENT
Start: 2023-10-05 | End: 2023-12-13

## 2023-10-05 RX ORDER — DICLOFENAC SODIUM 10 MG/G
2 GEL TOPICAL 4 TIMES DAILY
Qty: 50 G | Refills: 2 | Status: SHIPPED | OUTPATIENT
Start: 2023-10-05 | End: 2023-12-13 | Stop reason: SDUPTHER

## 2023-10-05 RX ORDER — HYDRALAZINE HYDROCHLORIDE 25 MG/1
25 TABLET, FILM COATED ORAL 3 TIMES DAILY
Qty: 90 TABLET | Refills: 11 | Status: SHIPPED | OUTPATIENT
Start: 2023-10-05 | End: 2023-11-01 | Stop reason: SDUPTHER

## 2023-10-06 ENCOUNTER — TELEPHONE (OUTPATIENT)
Dept: FAMILY MEDICINE | Facility: CLINIC | Age: 58
End: 2023-10-06
Payer: MEDICAID

## 2023-10-06 NOTE — TELEPHONE ENCOUNTER
Pt. notified by phone-verbalized understanding. He is working on getting a B/P cuff, his insurance does not cover.

## 2023-10-06 NOTE — TELEPHONE ENCOUNTER
----- Message from Dmitri Stover MD sent at 10/5/2023 11:53 AM CDT -----  Please contact the patient and let them know that his kidney function is looking worse; we need to get better control of his blood pressure. I am increasing his hydralazine to 25 mg TID and he should come back to clinic in 2 weeks for nurse visit for BP check and labs. I have placed the lab order. Did his pharmacy have his blood pressure cuff?

## 2023-11-01 ENCOUNTER — OFFICE VISIT (OUTPATIENT)
Dept: FAMILY MEDICINE | Facility: CLINIC | Age: 58
End: 2023-11-01
Payer: MEDICAID

## 2023-11-01 VITALS
TEMPERATURE: 98 F | OXYGEN SATURATION: 97 % | HEART RATE: 67 BPM | RESPIRATION RATE: 18 BRPM | HEIGHT: 66 IN | SYSTOLIC BLOOD PRESSURE: 132 MMHG | DIASTOLIC BLOOD PRESSURE: 84 MMHG | WEIGHT: 237.81 LBS | BODY MASS INDEX: 38.22 KG/M2

## 2023-11-01 DIAGNOSIS — M06.9 RHEUMATOID ARTHRITIS, INVOLVING UNSPECIFIED SITE, UNSPECIFIED WHETHER RHEUMATOID FACTOR PRESENT: ICD-10-CM

## 2023-11-01 DIAGNOSIS — I10 HYPERTENSION, UNSPECIFIED TYPE: Primary | ICD-10-CM

## 2023-11-01 DIAGNOSIS — N18.31 STAGE 3A CHRONIC KIDNEY DISEASE: ICD-10-CM

## 2023-11-01 DIAGNOSIS — I10 SEVERE UNCONTROLLED HYPERTENSION: ICD-10-CM

## 2023-11-01 LAB
ANION GAP SERPL CALCULATED.3IONS-SCNC: 9 MMOL/L (ref 7–16)
BUN SERPL-MCNC: 20 MG/DL (ref 7–18)
BUN/CREAT SERPL: 11 (ref 6–20)
CALCIUM SERPL-MCNC: 9.8 MG/DL (ref 8.5–10.1)
CHLORIDE SERPL-SCNC: 106 MMOL/L (ref 98–107)
CO2 SERPL-SCNC: 27 MMOL/L (ref 21–32)
CREAT SERPL-MCNC: 1.76 MG/DL (ref 0.7–1.3)
EGFR (NO RACE VARIABLE) (RUSH/TITUS): 44 ML/MIN/1.73M2
GLUCOSE SERPL-MCNC: 101 MG/DL (ref 74–106)
POTASSIUM SERPL-SCNC: 3.7 MMOL/L (ref 3.5–5.1)
SODIUM SERPL-SCNC: 138 MMOL/L (ref 136–145)

## 2023-11-01 PROCEDURE — 3075F PR MOST RECENT SYSTOLIC BLOOD PRESS GE 130-139MM HG: ICD-10-PCS | Mod: CPTII,,, | Performed by: STUDENT IN AN ORGANIZED HEALTH CARE EDUCATION/TRAINING PROGRAM

## 2023-11-01 PROCEDURE — 99214 PR OFFICE/OUTPT VISIT, EST, LEVL IV, 30-39 MIN: ICD-10-PCS | Mod: ,,, | Performed by: STUDENT IN AN ORGANIZED HEALTH CARE EDUCATION/TRAINING PROGRAM

## 2023-11-01 PROCEDURE — 1159F MED LIST DOCD IN RCRD: CPT | Mod: CPTII,,, | Performed by: STUDENT IN AN ORGANIZED HEALTH CARE EDUCATION/TRAINING PROGRAM

## 2023-11-01 PROCEDURE — 1160F PR REVIEW ALL MEDS BY PRESCRIBER/CLIN PHARMACIST DOCUMENTED: ICD-10-PCS | Mod: CPTII,,, | Performed by: STUDENT IN AN ORGANIZED HEALTH CARE EDUCATION/TRAINING PROGRAM

## 2023-11-01 PROCEDURE — 1160F RVW MEDS BY RX/DR IN RCRD: CPT | Mod: CPTII,,, | Performed by: STUDENT IN AN ORGANIZED HEALTH CARE EDUCATION/TRAINING PROGRAM

## 2023-11-01 PROCEDURE — 1159F PR MEDICATION LIST DOCUMENTED IN MEDICAL RECORD: ICD-10-PCS | Mod: CPTII,,, | Performed by: STUDENT IN AN ORGANIZED HEALTH CARE EDUCATION/TRAINING PROGRAM

## 2023-11-01 PROCEDURE — 80048 BASIC METABOLIC PNL TOTAL CA: CPT | Mod: ,,, | Performed by: CLINICAL MEDICAL LABORATORY

## 2023-11-01 PROCEDURE — 3008F PR BODY MASS INDEX (BMI) DOCUMENTED: ICD-10-PCS | Mod: CPTII,,, | Performed by: STUDENT IN AN ORGANIZED HEALTH CARE EDUCATION/TRAINING PROGRAM

## 2023-11-01 PROCEDURE — 80048 BASIC METABOLIC PANEL: ICD-10-PCS | Mod: ,,, | Performed by: CLINICAL MEDICAL LABORATORY

## 2023-11-01 PROCEDURE — 3044F PR MOST RECENT HEMOGLOBIN A1C LEVEL <7.0%: ICD-10-PCS | Mod: CPTII,,, | Performed by: STUDENT IN AN ORGANIZED HEALTH CARE EDUCATION/TRAINING PROGRAM

## 2023-11-01 PROCEDURE — 3075F SYST BP GE 130 - 139MM HG: CPT | Mod: CPTII,,, | Performed by: STUDENT IN AN ORGANIZED HEALTH CARE EDUCATION/TRAINING PROGRAM

## 2023-11-01 PROCEDURE — 3008F BODY MASS INDEX DOCD: CPT | Mod: CPTII,,, | Performed by: STUDENT IN AN ORGANIZED HEALTH CARE EDUCATION/TRAINING PROGRAM

## 2023-11-01 PROCEDURE — 99214 OFFICE O/P EST MOD 30 MIN: CPT | Mod: ,,, | Performed by: STUDENT IN AN ORGANIZED HEALTH CARE EDUCATION/TRAINING PROGRAM

## 2023-11-01 PROCEDURE — 3079F DIAST BP 80-89 MM HG: CPT | Mod: CPTII,,, | Performed by: STUDENT IN AN ORGANIZED HEALTH CARE EDUCATION/TRAINING PROGRAM

## 2023-11-01 PROCEDURE — 3044F HG A1C LEVEL LT 7.0%: CPT | Mod: CPTII,,, | Performed by: STUDENT IN AN ORGANIZED HEALTH CARE EDUCATION/TRAINING PROGRAM

## 2023-11-01 PROCEDURE — 3079F PR MOST RECENT DIASTOLIC BLOOD PRESSURE 80-89 MM HG: ICD-10-PCS | Mod: CPTII,,, | Performed by: STUDENT IN AN ORGANIZED HEALTH CARE EDUCATION/TRAINING PROGRAM

## 2023-11-01 RX ORDER — AMLODIPINE BESYLATE 10 MG/1
10 TABLET ORAL DAILY
Qty: 90 TABLET | Refills: 3 | Status: SHIPPED | OUTPATIENT
Start: 2023-11-01 | End: 2023-11-21 | Stop reason: SDUPTHER

## 2023-11-01 RX ORDER — HYDRALAZINE HYDROCHLORIDE 25 MG/1
25 TABLET, FILM COATED ORAL 3 TIMES DAILY
Qty: 90 TABLET | Refills: 11 | Status: SHIPPED | OUTPATIENT
Start: 2023-11-01 | End: 2024-10-31

## 2023-11-01 NOTE — PROGRESS NOTES
"Subjective     Patient ID: Clay Curtis is a 58 y.o. male.    Chief Complaint: Hypertension (Follow up for HTN. States has been taking all his medications as prescribed. Does not have any headaches anymore. States he is taking he BP at home and they are "running pretty good". Does not remember what it has been running and did not bring a log in. ) and Flu Vaccine (Would like to get flu shot today as well.)    HPI    Doing ok on current dose of amlodipine. Hydral no longer giving him headaches.     Has not taken prednisone in about 2 weeks. Doing ok but having some occasional pain in his b/l knees.    Generally feeling well with few concerns.       Objective     /84 (BP Location: Left arm, Patient Position: Sitting, BP Method: Large (Automatic))   Pulse 67   Temp 97.7 °F (36.5 °C) (Oral)   Resp 18   Ht 5' 6" (1.676 m)   Wt 107.9 kg (237 lb 12.8 oz)   SpO2 97%   BMI 38.38 kg/m²       Wt Readings from Last 3 Encounters:   11/01/23 0833 107.9 kg (237 lb 12.8 oz)   10/04/23 1424 108 kg (238 lb)   09/20/23 1420 108 kg (238 lb)         Physical Exam  Gen: well-groomed, well-dressed. No apparent distress  HEENT:  NCAT, symmetric  Pulm: normal work of breathing, no accessory muscle use; speaking complete sentences without having to stop  Neuro: CN II-XII grossly normal   Psych: pleasant affect, congruent mood. Linear thought; good eye contact  SKIN: no rashes present on face, neck, hands       Assessment and Plan     Problem List Items Addressed This Visit       Hypertension    Relevant Medications    amLODIPine (NORVASC) 10 MG tablet    Rheumatoid arthritis - Primary    Stage 3a chronic kidney disease     Other Visit Diagnoses       Severe uncontrolled hypertension        Relevant Medications    hydrALAZINE (APRESOLINE) 25 MG tablet          HTN - MUCH better controlled today.     CKD - repeat BMP; monitor kidney function. Encouraged good adherence to BP regimen, adequate hydration, and avoidance of " neprhotoxins     RA - seems to be under greater control; is having b/l knee pain today; the pain is tolerable. Does not seem totally inflammatory and may have some component of OA there. Has f/u with rheum this month. Will continue to discuss comprehensive management of knee pain that avoids the use of steroids    Obesity - Talked about diet today and need for weight loss. If he remains of prednisone successfully at 6 week follow-up and weight loss continues, may hold off on metformin  / GLPRA. However will discuss this with him at the next visit to help him in his lifestyle changes and insulin resistance.

## 2023-11-01 NOTE — PATIENT INSTRUCTIONS
Overall, work on changing your diet to simple foods you can prepare at home. Avoid fried food and pre-packaged food like chips and cookies. Avoid sugary drinks like soda and sweet tea. The best diet is one with lots of whole foods that you can cook - fresh or frozen fruits, fresh or frozen vegetables, whole grains, eggs, beans, and meat.     10 Realistic Ways to Eat Less Processed Food    Processed food is any food item that has been canned, cooked, frozen, pasteurized, or packaged.    You can enjoy many processed foods, including canned vegetables, frozen fruits, and pasteurized dairy products, as part of a healthy diet. However, some highly processed items are loaded with salt, sugar, additives, and preservatives, which can harm your health.    Reducing your intake of these highly processed foods is one of the most effective ways to improve your health and enhance the quality of your diet.    In fact, when people ask me for nutritional advice, cutting down on processed foods is one of the first things I recommend.    Here are 10 simple, sustainable, and realistic strategies to help you eat less processed food.          1. Keep healthy snacks on hand    If youre running short on time, grabbing a packaged snack on your way out the door may be tempting.    However, keeping your kitchen stocked with plenty of portable, nutritious snacks can make it much easier to make healthy choices on the go.    Some of my favorite healthy snacks include fresh fruit, mixed nuts, edamame, and veggies with hummus.    If you have extra time, you can also prep some simple snacks in advance. Hard-boiled eggs, turkey roll-ups, homemade kale chips, and overnight oats are a few great treats that you can whip up quickly and keep on hand for later.        2. Swap refined grains for whole grains    One of the simplest ways to reduce your intake of processed foods is to start trading them for healthier whole foods.    In particular, you can  swap refined grains like white pasta, rice, bread, and tortillas for whole grain alternatives, such as brown rice and whole grain pasta, bread, and tortillas.    Not only are whole grains higher in important nutrients like fiber, but theyve also been shown to protect against conditions like heart disease, diabetes, and certain types of cancer         3. Get creative in the kitchen    If youre feeling adventurous, give your favorite processed foods a healthy twist by recreating them in your kitchen. This gives you complete control of what youre putting on your plate while letting you experiment with interesting new ingredients.    For example, you can make veggie chips by tossing potato, zucchini, turnip, or carrot slices with a bit of olive oil and salt, then baking them until theyre crispy.    Other healthy alternatives to processed foods that you can whip up at home include radha pudding, air-popped popcorn, granola bars, and fruit leather.    Personally, I love trying to recreate meals from my favorite restaurants at home instead of ordering takeout. In addition to saving money, this makes it easier to eat more whole foods by loading up on ingredients like fruits, veggies, nuts, seeds, and legumes.        4. Drink more water    Sugary beverages like soda, sweet tea, fruit juice, and sports drinks are high in sugar and calories but low in essential nutrients.    Gradually trading these drinks for water throughout the day is a great way to cut back on your intake of processed foods and improve your overall diet quality.    Sparkling or flavored water are two great options if plain water isnt your favorite beverage. Alternatively, you can try infusing water with fresh fruit or herbs for an added burst of flavor.        5. Try meal prepping    Preparing meals in large batches once or twice each week ensures that you have plenty of nutritious meals ready in your fridge even when youre too busy to cook.    It can  also make it much less tempting to hit the drive-through on your way home or turn to frozen convenience meals when youre pressed for time.    To get started, pick a few recipes to make each week and set aside a specific time to prepare your meals.    I also prefer finding a few recipes that share similar ingredients so that I can rotate through several meals during the week to avoid repetition.        6. Eat more vegetables    When youre preparing meals at home, include at least one serving of vegetables to increase your intake of healthy, unprocessed foods.    This can be as easy as adding spinach to your scrambled eggs, sautéing broccoli for a simple side dish, or tossing carrots or cauliflower into soups or casseroles.    Vegetables are highly nutritious and great sources of fiber, which keeps you feeling full between meals to help decrease your appetite and curb cravings        7. Switch up your shopping routine    Its much easier to limit your intake of processed foods when you dont have any on hand.    Next time you go to the grocery store, fill your cart up with healthy, minimally processed ingredients like fruits, vegetables, whole grains, and legumes.    You can also try sticking to the perimeter of the store and avoiding the middle aisles, which is where processed snacks and junk foods are typically found.        8. Try some simple food swaps    There are countless healthy swaps for many processed products. Here are a few of my favorites:    Trade your sugary breakfast cereal for a bowl of oatmeal with fresh fruit.  Pop your own popcorn on the stove in place of microwave popcorn.  Whip up a homemade vinaigrette with olive oil and vinegar to drizzle over salads in place of processed dressings.  Make trail mix using nuts, seeds, and dried fruit for a healthy alternative to store-bought varieties.  Top your salads with nuts or seeds instead of croutons.    Be sure to read labels on your favorite food  products when youre shopping. Whenever possible, steer clear of foods with lots of sodium, trans fat, or added sugar.        9. Eat less processed meat    Processed meats like traore, sausage, lunch meat, and hot dogs are associated with several downsides and even classified as carcinogenic by the International Agency for Research on Cancer (4).    Youll be glad to hear that there are plenty of easy ways to cut back on processed meat.    For starters, you can simply swap these foods for less processed varieties of meat, such as fresh chicken, salmon, or turkey. You can also replace packaged lunch meats with other sandwich fillings, including tuna salad, chicken breast, or hard-boiled eggs.    Alternatively, you can eat more plant-based proteins, such as beans, lentils, tofu, or tempeh.        10. Make changes slowly      Theres no need to completely eliminate processed foods from your diet all at once.    In fact, making changes slowly is often more effective and sustainable in the long run. Some research suggests that minor lifestyle changes help form long-lasting habits and make actions that are initially difficult much easier over time ([employer]).    Each week, try experimenting with one or two of the strategies listed above, then gradually implement more.        The bottom line    Processed foods are any food that has been cooked, canned, frozen, or packaged.    Although you can eat numerous processed foods as part of a healthy diet, you should limit those that are high in sodium, sugar, additives, and preservatives.    Try a few of the tips outlined in this article to find what works for you, and remember to make changes slowly for the best results.    Keep in mind that you can still enjoy dining out or eating processed foods in moderation as part of a healthy, balanced diet.

## 2023-11-01 NOTE — PROGRESS NOTES
Discussed care gaps with pt. Would like to get flu shot If available. Believes he has had HIV screening but unsure where or when. Does not want to update others at this time.

## 2023-11-07 ENCOUNTER — TELEPHONE (OUTPATIENT)
Dept: FAMILY MEDICINE | Facility: CLINIC | Age: 58
End: 2023-11-07
Payer: MEDICAID

## 2023-11-07 NOTE — PROGRESS NOTES
Please contact the patient and let them know that his kidney function is stable. He should keep taking his blood pressure medication as previously recommended to help his kidneys heal and prevent any further damage.    We will get a recheck of his kidney function at his upcoming appointment next month

## 2023-11-07 NOTE — TELEPHONE ENCOUNTER
----- Message from Dmitri Stover MD sent at 11/7/2023  2:30 PM CST -----  Please contact the patient and let them know that his kidney function is stable. He should keep taking his blood pressure medication as previously recommended to help his kidneys heal and prevent any further damage.    We will get a recheck of his kidney function at his upcoming appointment next month

## 2023-11-21 DIAGNOSIS — I10 HYPERTENSION, UNSPECIFIED TYPE: ICD-10-CM

## 2023-11-21 RX ORDER — AMLODIPINE BESYLATE 10 MG/1
10 TABLET ORAL DAILY
Qty: 90 TABLET | Refills: 3 | Status: SHIPPED | OUTPATIENT
Start: 2023-11-21 | End: 2024-11-20

## 2023-12-13 ENCOUNTER — OFFICE VISIT (OUTPATIENT)
Dept: FAMILY MEDICINE | Facility: CLINIC | Age: 58
End: 2023-12-13
Payer: MEDICAID

## 2023-12-13 VITALS
DIASTOLIC BLOOD PRESSURE: 85 MMHG | HEART RATE: 68 BPM | HEIGHT: 66 IN | TEMPERATURE: 98 F | BODY MASS INDEX: 37.8 KG/M2 | WEIGHT: 235.19 LBS | OXYGEN SATURATION: 97 % | SYSTOLIC BLOOD PRESSURE: 137 MMHG

## 2023-12-13 DIAGNOSIS — N17.9 AKI (ACUTE KIDNEY INJURY): ICD-10-CM

## 2023-12-13 DIAGNOSIS — M06.9 RHEUMATOID ARTHRITIS, INVOLVING UNSPECIFIED SITE, UNSPECIFIED WHETHER RHEUMATOID FACTOR PRESENT: Primary | ICD-10-CM

## 2023-12-13 DIAGNOSIS — I10 HYPERTENSION, UNSPECIFIED TYPE: ICD-10-CM

## 2023-12-13 LAB
ANION GAP SERPL CALCULATED.3IONS-SCNC: 9 MMOL/L (ref 7–16)
BUN SERPL-MCNC: 32 MG/DL (ref 7–18)
BUN/CREAT SERPL: 17 (ref 6–20)
CALCIUM SERPL-MCNC: 10.1 MG/DL (ref 8.5–10.1)
CHLORIDE SERPL-SCNC: 108 MMOL/L (ref 98–107)
CO2 SERPL-SCNC: 28 MMOL/L (ref 21–32)
CREAT SERPL-MCNC: 1.91 MG/DL (ref 0.7–1.3)
EGFR (NO RACE VARIABLE) (RUSH/TITUS): 40 ML/MIN/1.73M2
GLUCOSE SERPL-MCNC: 101 MG/DL (ref 74–106)
POTASSIUM SERPL-SCNC: 4.1 MMOL/L (ref 3.5–5.1)
SODIUM SERPL-SCNC: 141 MMOL/L (ref 136–145)

## 2023-12-13 PROCEDURE — 80048 BASIC METABOLIC PANEL: ICD-10-PCS | Mod: ,,, | Performed by: CLINICAL MEDICAL LABORATORY

## 2023-12-13 PROCEDURE — 99214 PR OFFICE/OUTPT VISIT, EST, LEVL IV, 30-39 MIN: ICD-10-PCS | Mod: ,,, | Performed by: STUDENT IN AN ORGANIZED HEALTH CARE EDUCATION/TRAINING PROGRAM

## 2023-12-13 PROCEDURE — 1159F MED LIST DOCD IN RCRD: CPT | Mod: CPTII,,, | Performed by: STUDENT IN AN ORGANIZED HEALTH CARE EDUCATION/TRAINING PROGRAM

## 2023-12-13 PROCEDURE — 3079F DIAST BP 80-89 MM HG: CPT | Mod: CPTII,,, | Performed by: STUDENT IN AN ORGANIZED HEALTH CARE EDUCATION/TRAINING PROGRAM

## 2023-12-13 PROCEDURE — 3079F PR MOST RECENT DIASTOLIC BLOOD PRESSURE 80-89 MM HG: ICD-10-PCS | Mod: CPTII,,, | Performed by: STUDENT IN AN ORGANIZED HEALTH CARE EDUCATION/TRAINING PROGRAM

## 2023-12-13 PROCEDURE — 1159F PR MEDICATION LIST DOCUMENTED IN MEDICAL RECORD: ICD-10-PCS | Mod: CPTII,,, | Performed by: STUDENT IN AN ORGANIZED HEALTH CARE EDUCATION/TRAINING PROGRAM

## 2023-12-13 PROCEDURE — 3008F BODY MASS INDEX DOCD: CPT | Mod: CPTII,,, | Performed by: STUDENT IN AN ORGANIZED HEALTH CARE EDUCATION/TRAINING PROGRAM

## 2023-12-13 PROCEDURE — 3075F SYST BP GE 130 - 139MM HG: CPT | Mod: CPTII,,, | Performed by: STUDENT IN AN ORGANIZED HEALTH CARE EDUCATION/TRAINING PROGRAM

## 2023-12-13 PROCEDURE — 3008F PR BODY MASS INDEX (BMI) DOCUMENTED: ICD-10-PCS | Mod: CPTII,,, | Performed by: STUDENT IN AN ORGANIZED HEALTH CARE EDUCATION/TRAINING PROGRAM

## 2023-12-13 PROCEDURE — 80048 BASIC METABOLIC PNL TOTAL CA: CPT | Mod: ,,, | Performed by: CLINICAL MEDICAL LABORATORY

## 2023-12-13 PROCEDURE — 99214 OFFICE O/P EST MOD 30 MIN: CPT | Mod: ,,, | Performed by: STUDENT IN AN ORGANIZED HEALTH CARE EDUCATION/TRAINING PROGRAM

## 2023-12-13 PROCEDURE — 1160F PR REVIEW ALL MEDS BY PRESCRIBER/CLIN PHARMACIST DOCUMENTED: ICD-10-PCS | Mod: CPTII,,, | Performed by: STUDENT IN AN ORGANIZED HEALTH CARE EDUCATION/TRAINING PROGRAM

## 2023-12-13 PROCEDURE — 3075F PR MOST RECENT SYSTOLIC BLOOD PRESS GE 130-139MM HG: ICD-10-PCS | Mod: CPTII,,, | Performed by: STUDENT IN AN ORGANIZED HEALTH CARE EDUCATION/TRAINING PROGRAM

## 2023-12-13 PROCEDURE — 1160F RVW MEDS BY RX/DR IN RCRD: CPT | Mod: CPTII,,, | Performed by: STUDENT IN AN ORGANIZED HEALTH CARE EDUCATION/TRAINING PROGRAM

## 2023-12-13 PROCEDURE — 3044F PR MOST RECENT HEMOGLOBIN A1C LEVEL <7.0%: ICD-10-PCS | Mod: CPTII,,, | Performed by: STUDENT IN AN ORGANIZED HEALTH CARE EDUCATION/TRAINING PROGRAM

## 2023-12-13 PROCEDURE — 3044F HG A1C LEVEL LT 7.0%: CPT | Mod: CPTII,,, | Performed by: STUDENT IN AN ORGANIZED HEALTH CARE EDUCATION/TRAINING PROGRAM

## 2023-12-13 RX ORDER — HYDROCHLOROTHIAZIDE 12.5 MG/1
12.5 CAPSULE ORAL DAILY
Qty: 30 CAPSULE | Refills: 5 | Status: SHIPPED | OUTPATIENT
Start: 2023-12-13

## 2023-12-13 RX ORDER — DICLOFENAC SODIUM 10 MG/G
2 GEL TOPICAL 4 TIMES DAILY
Qty: 50 G | Refills: 2 | Status: SHIPPED | OUTPATIENT
Start: 2023-12-13

## 2023-12-13 NOTE — PROGRESS NOTES
"Subjective     Patient ID: Clay Curtis is a 58 y.o. male.    Chief Complaint: Follow-up and Hypertension (Needs refill of HCTZ and Diclofenac Gel)    HPI    Clay Curtis is a 58 y.o. patient with the medical problems listed below who comes to clinic for follow-up. His kidney function has been stable / somewhat improved in tandem with greater BP control. His blood pressure has improved and is just slightly above goal. He has lost a little weight.    Has been completely off prednisone approx 6 weeks. Reports the Xeljanz has helped tremendously  Has rheum f/u tomorrow  Pain is controlled.          Objective   /85   Pulse 68   Temp 98.2 °F (36.8 °C)   Ht 5' 6" (1.676 m)   Wt 106.7 kg (235 lb 3.2 oz)   SpO2 97%   BMI 37.96 kg/m²     Wt Readings from Last 3 Encounters:   12/13/23 0842 106.7 kg (235 lb 3.2 oz)   11/01/23 0833 107.9 kg (237 lb 12.8 oz)   10/04/23 1424 108 kg (238 lb)       Physical Exam    Gen: well-groomed, well-dressed. No apparent distress  HEENT:  NCAT, symmetric  Pulm: normal work of breathing, no accessory muscle use; speaking complete sentences without having to stop  Neuro: CN II-XII grossly normal   Psych: pleasant affect, congruent mood. Linear thought; good eye contact  SKIN: no rashes present on face, neck, hands         Assessment and Plan     Problem List Items Addressed This Visit       Hypertension    Relevant Medications    hydroCHLOROthiazide (MICROZIDE) 12.5 mg capsule    Other Relevant Orders    Basic Metabolic Panel    Rheumatoid arthritis - Primary    Relevant Medications    diclofenac sodium (VOLTAREN) 1 % Gel     Other Visit Diagnoses       ROX (acute kidney injury)              HTN is better and he is down 3 lbs from his October visit. Really believe the prednisone was driving his weight gain -> HTN -> insulin resistance. Suspect all these will continue to improve off of steroids. F/u A1c in 6-8 weeks. Check BMP today to ensure kidney function is stable.     Face " to face encounter time 10 minutes.    Non face to face encounter time 20 minutes.  Reviewing separate obtained history, counseling and educating the patient, family and/or other caregivers, ordering medications, tests or procedures; referring and communicating with other health care professionals; documenting clinical information in the electronic medical record; care coordination; independently interpreting results and communicating results to the patient, family, and/or caregivers.    Total time for visit  30 minutes

## 2023-12-21 ENCOUNTER — E-CONSULT (OUTPATIENT)
Dept: NEPHROLOGY | Facility: CLINIC | Age: 58
End: 2023-12-21
Payer: MEDICAID

## 2023-12-21 ENCOUNTER — TELEPHONE (OUTPATIENT)
Dept: FAMILY MEDICINE | Facility: CLINIC | Age: 58
End: 2023-12-21
Payer: MEDICAID

## 2023-12-21 DIAGNOSIS — N18.30 STAGE 3 CHRONIC KIDNEY DISEASE, UNSPECIFIED WHETHER STAGE 3A OR 3B CKD: Primary | ICD-10-CM

## 2023-12-21 DIAGNOSIS — N18.32 STAGE 3B CHRONIC KIDNEY DISEASE: Primary | ICD-10-CM

## 2023-12-21 PROCEDURE — 99449 NTRPROF PH1/NTRNET/EHR 31/>: CPT | Mod: ,,, | Performed by: INTERNAL MEDICINE

## 2023-12-21 PROCEDURE — 99449 PR INTERPROF, PHONE/INTERNET/EHR, CONSULT, >= 31 MINS: ICD-10-PCS | Mod: ,,, | Performed by: INTERNAL MEDICINE

## 2023-12-21 NOTE — TELEPHONE ENCOUNTER
Kidney function has not improved much despite better BP control. Given his hx of autoimmune disease will e-consult nephrology to discuss next steps for him.

## 2023-12-21 NOTE — TELEPHONE ENCOUNTER
E-consult nephrologist recommended in-person eval. Reviewed recent records faxed over from [facility] - sCr at that time was 1.83; so there is some fluctuation but overall a decline in GFR. Will place referral and discuss w/ patient.     - -    Please call the patient to let him know I am referring him to nephrology to take  a look at his kidneys. I am also setting up a kidney US here at [facility]. I am not too concerned, but we need to make sure that there is not something else going on that could be hurting the kidneys.

## 2023-12-21 NOTE — CONSULTS
[facility]  Response for E-Consult     Patient Name: Clay Curtis  MRN: [id number]  Primary Care Provider: Dmitri Stover MD   Requesting Provider: Dmitir Stover MD  Consults    After evaluation of your eConsult clinical questions, I believe the patient should be scheduled for an office visit in our specialty due to progressive chronic kidney disease.  On review of the chart his creatinine has increased from around 1.4 up to possibly 2.0 the past year.  He needs to be evaluated by Nephrology for possible reversible causes or hopefully to stabilize his renal function.    Total time of Consultation: 35 minute    *This eConsult is based on the clinical data available to me and is furnished without benefit of a physician examination.  The eConsult will need to be interpreted in light of any clinical issues of changes in patient status not available to me at the time rime of filing this eConsults.  Significant changes in patient condition of level of acuity should result in a referral for in person consultation and reevaluation.  Please alert me if you have any furth questions.     Thank you for this eConsult referral.     Matthieu Weiner MD  [facility]

## 2024-01-02 ENCOUNTER — HOSPITAL ENCOUNTER (OUTPATIENT)
Dept: RADIOLOGY | Facility: HOSPITAL | Age: 59
Discharge: HOME | End: 2024-01-02
Attending: STUDENT IN AN ORGANIZED HEALTH CARE EDUCATION/TRAINING PROGRAM
Payer: MEDICAID

## 2024-01-02 DIAGNOSIS — N18.32 STAGE 3B CHRONIC KIDNEY DISEASE: ICD-10-CM

## 2024-01-02 PROCEDURE — 76770 US EXAM ABDO BACK WALL COMP: CPT | Mod: TC

## 2024-01-04 NOTE — PROGRESS NOTES
Next available appt with Dr. Church is June 11 @ 3:20.  Pt notified of US results and appt date and time.

## 2024-03-13 ENCOUNTER — OFFICE VISIT (OUTPATIENT)
Dept: FAMILY MEDICINE | Facility: CLINIC | Age: 59
End: 2024-03-13
Payer: MEDICAID

## 2024-03-13 VITALS
OXYGEN SATURATION: 96 % | TEMPERATURE: 98 F | HEIGHT: 66 IN | HEART RATE: 63 BPM | DIASTOLIC BLOOD PRESSURE: 79 MMHG | WEIGHT: 223 LBS | SYSTOLIC BLOOD PRESSURE: 135 MMHG | BODY MASS INDEX: 35.84 KG/M2

## 2024-03-13 DIAGNOSIS — Z13.220 SCREENING FOR HYPERLIPIDEMIA: ICD-10-CM

## 2024-03-13 DIAGNOSIS — E79.0 ELEVATED BLOOD URIC ACID LEVEL: ICD-10-CM

## 2024-03-13 DIAGNOSIS — M06.9 RHEUMATOID ARTHRITIS, INVOLVING UNSPECIFIED SITE, UNSPECIFIED WHETHER RHEUMATOID FACTOR PRESENT: ICD-10-CM

## 2024-03-13 DIAGNOSIS — I10 HYPERTENSION, UNSPECIFIED TYPE: Primary | ICD-10-CM

## 2024-03-13 DIAGNOSIS — R73.03 PREDIABETES: ICD-10-CM

## 2024-03-13 PROCEDURE — 1160F RVW MEDS BY RX/DR IN RCRD: CPT | Mod: CPTII,,, | Performed by: STUDENT IN AN ORGANIZED HEALTH CARE EDUCATION/TRAINING PROGRAM

## 2024-03-13 PROCEDURE — 80053 COMPREHEN METABOLIC PANEL: CPT | Mod: ,,, | Performed by: CLINICAL MEDICAL LABORATORY

## 2024-03-13 PROCEDURE — 3078F DIAST BP <80 MM HG: CPT | Mod: CPTII,,, | Performed by: STUDENT IN AN ORGANIZED HEALTH CARE EDUCATION/TRAINING PROGRAM

## 2024-03-13 PROCEDURE — 3008F BODY MASS INDEX DOCD: CPT | Mod: CPTII,,, | Performed by: STUDENT IN AN ORGANIZED HEALTH CARE EDUCATION/TRAINING PROGRAM

## 2024-03-13 PROCEDURE — 1159F MED LIST DOCD IN RCRD: CPT | Mod: CPTII,,, | Performed by: STUDENT IN AN ORGANIZED HEALTH CARE EDUCATION/TRAINING PROGRAM

## 2024-03-13 PROCEDURE — 99214 OFFICE O/P EST MOD 30 MIN: CPT | Mod: ,,, | Performed by: STUDENT IN AN ORGANIZED HEALTH CARE EDUCATION/TRAINING PROGRAM

## 2024-03-13 PROCEDURE — 3075F SYST BP GE 130 - 139MM HG: CPT | Mod: CPTII,,, | Performed by: STUDENT IN AN ORGANIZED HEALTH CARE EDUCATION/TRAINING PROGRAM

## 2024-03-13 NOTE — PATIENT INSTRUCTIONS
Keep working on weight loss and physical activity.    We will talk in July about coming off of some of your blood pressure medication. The more weight you can lose, the higher likelihood we can come off of some of this blood pressure medicine.

## 2024-03-13 NOTE — PROGRESS NOTES
"Subjective     Patient ID: Clay Curtis is a 58 y.o. male.    Chief Complaint: Hypertension (Followup; started new bp med in December.)    HPI  Here for follow-up for HTN, CKD, and rheumatoid arthritis.    He has been doing fairly well. Pain is under control and he is able to spend more time in the woods hunting without as much trouble with his feet and hands. Still on Xeljanz and completely off prednisone.     Has lost a little more weight since last time.     HTN is finally under control. He denies any adverse effects.    Has nephrology appt scheduled for June.          Objective   /79   Pulse 63   Temp 98.1 °F (36.7 °C)   Ht 5' 6" (1.676 m)   Wt 101.2 kg (223 lb)   SpO2 96%   BMI 35.99 kg/m²     Wt Readings from Last 3 Encounters:   03/13/24 1006 101.2 kg (223 lb)   12/13/23 0842 106.7 kg (235 lb 3.2 oz)   11/01/23 0833 107.9 kg (237 lb 12.8 oz)       Physical Exam  Gen: well-groomed, well-dressed. No apparent distress  HEENT:  NCAT, symmetric  Pulm: normal work of breathing, no accessory muscle use; speaking complete sentences without having to stop  Neuro: CN II-XII grossly normal   Psych: pleasant affect, congruent mood. Linear thought; good eye contact  SKIN: no rashes present on face, neck, hands       Assessment and Plan     Problem List Items Addressed This Visit       Hypertension - Primary    Relevant Orders    Comprehensive Metabolic Panel (Completed)    Rheumatoid arthritis    Relevant Orders    Comprehensive Metabolic Panel (Completed)     Other Visit Diagnoses       Elevated blood uric acid level        Relevant Orders    Uric Acid    Screening for hyperlipidemia        Relevant Orders    Lipid Panel    Prediabetes        Relevant Orders    Hemoglobin A1C          HTN - The current medical regimen is effective;  continue present plan and medications. Will try to come off Hydral soon.   RA - The current medical regimen is effective;  continue present plan and medications.  Elevated uric " acid level - is taking allopurinol 100; will recheck uric acid level  HLD - screening - will check lipids  Prediabetes - insulin resistance likely related to prednisone use; will monitor this with A1c today    Instructions:     Keep working on weight loss and physical activity.    We will talk in July about coming off of some of your blood pressure medication. The more weight you can lose, the higher likelihood we can come off of some of this blood pressure medicine.     Face to face encounter time 15 minutes.    Non face to face encounter time 15 minutes.  Reviewing separate obtained history, counseling and educating the patient, family and/or other caregivers, ordering medications, tests or procedures; referring and communicating with other health care professionals; documenting clinical information in the electronic medical record; care coordination; independently interpreting results and communicating results to the patient, family, and/or caregivers.    Total time for visit  30 minutes

## 2024-03-14 LAB
ALBUMIN SERPL BCP-MCNC: 3.7 G/DL (ref 3.5–5)
ALBUMIN/GLOB SERPL: 0.9 {RATIO}
ALP SERPL-CCNC: 63 U/L (ref 45–115)
ALT SERPL W P-5'-P-CCNC: 27 U/L (ref 16–61)
ANION GAP SERPL CALCULATED.3IONS-SCNC: 5 MMOL/L (ref 7–16)
AST SERPL W P-5'-P-CCNC: 16 U/L (ref 15–37)
BILIRUB SERPL-MCNC: 0.3 MG/DL (ref ?–1.2)
BUN SERPL-MCNC: 23 MG/DL (ref 7–18)
BUN/CREAT SERPL: 14 (ref 6–20)
CALCIUM SERPL-MCNC: 10.4 MG/DL (ref 8.5–10.1)
CHLORIDE SERPL-SCNC: 110 MMOL/L (ref 98–107)
CO2 SERPL-SCNC: 31 MMOL/L (ref 21–32)
CREAT SERPL-MCNC: 1.68 MG/DL (ref 0.7–1.3)
EGFR (NO RACE VARIABLE) (RUSH/TITUS): 47 ML/MIN/1.73M2
GLOBULIN SER-MCNC: 4.1 G/DL (ref 2–4)
GLUCOSE SERPL-MCNC: 110 MG/DL (ref 74–106)
POTASSIUM SERPL-SCNC: 3.8 MMOL/L (ref 3.5–5.1)
PROT SERPL-MCNC: 7.8 G/DL (ref 6.4–8.2)
SODIUM SERPL-SCNC: 142 MMOL/L (ref 136–145)

## 2024-03-15 ENCOUNTER — TELEPHONE (OUTPATIENT)
Dept: FAMILY MEDICINE | Facility: CLINIC | Age: 59
End: 2024-03-15
Payer: MEDICAID

## 2024-03-15 NOTE — TELEPHONE ENCOUNTER
Pt notified   Dupixent Counseling: I discussed with the patient the risks of dupilumab including but not limited to eye infection and irritation, cold sores, injection site reactions, worsening of asthma, allergic reactions and increased risk of parasitic infection.  Live vaccines should be avoided while taking dupilumab. Dupilumab will also interact with certain medications such as warfarin and cyclosporine. The patient understands that monitoring is required and they must alert us or the primary physician if symptoms of infection or other concerning signs are noted.

## 2024-03-15 NOTE — TELEPHONE ENCOUNTER
----- Message from Dmitri Stover MD sent at 3/14/2024  3:13 PM CDT -----  Please contact the patient and let them know that his kidney function is stable. continue current plan.

## 2024-04-08 ENCOUNTER — OFFICE VISIT (OUTPATIENT)
Dept: FAMILY MEDICINE | Facility: CLINIC | Age: 59
End: 2024-04-08
Payer: MEDICAID

## 2024-04-08 VITALS
HEIGHT: 66 IN | BODY MASS INDEX: 35.39 KG/M2 | WEIGHT: 220.19 LBS | DIASTOLIC BLOOD PRESSURE: 89 MMHG | OXYGEN SATURATION: 97 % | HEART RATE: 92 BPM | TEMPERATURE: 99 F | SYSTOLIC BLOOD PRESSURE: 140 MMHG

## 2024-04-08 DIAGNOSIS — R05.9 COUGH, UNSPECIFIED TYPE: Primary | ICD-10-CM

## 2024-04-08 DIAGNOSIS — J01.90 ACUTE SINUSITIS, RECURRENCE NOT SPECIFIED, UNSPECIFIED LOCATION: ICD-10-CM

## 2024-04-08 LAB
CTP QC/QA: YES
CTP QC/QA: YES
POC MOLECULAR INFLUENZA A AGN: NEGATIVE
POC MOLECULAR INFLUENZA B AGN: NEGATIVE
SARS-COV-2 RDRP RESP QL NAA+PROBE: NEGATIVE

## 2024-04-08 PROCEDURE — 3077F SYST BP >= 140 MM HG: CPT | Mod: CPTII,,, | Performed by: STUDENT IN AN ORGANIZED HEALTH CARE EDUCATION/TRAINING PROGRAM

## 2024-04-08 PROCEDURE — 1159F MED LIST DOCD IN RCRD: CPT | Mod: CPTII,,, | Performed by: STUDENT IN AN ORGANIZED HEALTH CARE EDUCATION/TRAINING PROGRAM

## 2024-04-08 PROCEDURE — 99213 OFFICE O/P EST LOW 20 MIN: CPT | Mod: ,,, | Performed by: STUDENT IN AN ORGANIZED HEALTH CARE EDUCATION/TRAINING PROGRAM

## 2024-04-08 PROCEDURE — 3008F BODY MASS INDEX DOCD: CPT | Mod: CPTII,,, | Performed by: STUDENT IN AN ORGANIZED HEALTH CARE EDUCATION/TRAINING PROGRAM

## 2024-04-08 PROCEDURE — 1160F RVW MEDS BY RX/DR IN RCRD: CPT | Mod: CPTII,,, | Performed by: STUDENT IN AN ORGANIZED HEALTH CARE EDUCATION/TRAINING PROGRAM

## 2024-04-08 PROCEDURE — 3079F DIAST BP 80-89 MM HG: CPT | Mod: CPTII,,, | Performed by: STUDENT IN AN ORGANIZED HEALTH CARE EDUCATION/TRAINING PROGRAM

## 2024-04-08 PROCEDURE — 87635 SARS-COV-2 COVID-19 AMP PRB: CPT | Mod: RHCUB | Performed by: STUDENT IN AN ORGANIZED HEALTH CARE EDUCATION/TRAINING PROGRAM

## 2024-04-08 PROCEDURE — 87502 INFLUENZA DNA AMP PROBE: CPT | Mod: RHCUB | Performed by: STUDENT IN AN ORGANIZED HEALTH CARE EDUCATION/TRAINING PROGRAM

## 2024-04-08 RX ORDER — AMOXICILLIN AND CLAVULANATE POTASSIUM 875; 125 MG/1; MG/1
1 TABLET, FILM COATED ORAL EVERY 12 HOURS
Qty: 10 TABLET | Refills: 0 | Status: SHIPPED | OUTPATIENT
Start: 2024-04-08 | End: 2024-04-13

## 2024-04-08 RX ORDER — GUAIFENESIN AND DEXTROMETHORPHAN HYDROBROMIDE 10; 100 MG/5ML; MG/5ML
10 SYRUP ORAL EVERY 6 HOURS PRN
Qty: 236 ML | Refills: 10 | Status: SHIPPED | OUTPATIENT
Start: 2024-04-08 | End: 2024-04-18

## 2024-04-08 RX ORDER — AZITHROMYCIN 250 MG/1
TABLET, FILM COATED ORAL
Qty: 6 TABLET | Refills: 0 | Status: SHIPPED | OUTPATIENT
Start: 2024-04-08 | End: 2024-04-13

## 2024-04-08 RX ORDER — OXYMETAZOLINE HYDROCHLORIDE 0.05 G/100ML
2 SPRAY, METERED NASAL 2 TIMES DAILY PRN
Qty: 14.7 ML | Refills: 0 | Status: SHIPPED | OUTPATIENT
Start: 2024-04-08 | End: 2024-04-11

## 2024-04-08 NOTE — PATIENT INSTRUCTIONS
Follow these steps:    Step 1: Use Afrin in both nostrils  Step 2: wait 5-10 minutes then do a saline spray rinse     Repeat this procedure twice a day for 3 days.

## 2024-04-10 NOTE — PROGRESS NOTES
"Subjective     Patient ID: Clay Curtis is a 58 y.o. male.    Chief Complaint: Cough, Nasal Congestion, Headache, and Nausea    HPI    Clay Curtis is a 58 y.o. patient with the medical problems listed below who comes to clinic with URI symptoms the last several days. He does not seem to be improving. Theraflu has not helped with his symptoms very much. He does not have any shortness of breath. Does not think he has had any fever but feels awful.       Objective   BP (!) 140/89   Pulse 92   Temp 98.5 °F (36.9 °C)   Ht 5' 6" (1.676 m)   Wt 99.9 kg (220 lb 3.2 oz)   SpO2 97%   BMI 35.54 kg/m²     Physical Exam  Gen: well-groomed, well-dressed. No apparent distress  HEENT:  NCAT, symmetric  Pulm: normal work of breathing, no accessory muscle use; speaking complete sentences without having to stop  Neuro: CN II-XII grossly normal   Psych: pleasant affect, congruent mood. Linear thought; good eye contact  SKIN: no rashes present on face, neck, hands       Assessment and Plan     Problem List Items Addressed This Visit    None  Visit Diagnoses       Cough, unspecified type    -  Primary    Relevant Medications    amoxicillin-clavulanate 875-125mg (AUGMENTIN) 875-125 mg per tablet    azithromycin (Z-MINA) 250 MG tablet    dextromethorphan-guaiFENesin  mg/5 ml (ROBITUSSIN-DM)  mg/5 mL liquid    Other Relevant Orders    POCT COVID-19 Rapid Screening (Completed)    POCT Influenza A/B Molecular (Completed)    Acute sinusitis, recurrence not specified, unspecified location        Relevant Medications    amoxicillin-clavulanate 875-125mg (AUGMENTIN) 875-125 mg per tablet    azithromycin (Z-MINA) 250 MG tablet    oxymetazoline (AFRIN, OXYMETAZOLINE,) 0.05 % Mist    sodium chloride 0.9 % SprA          Given his immunosuppressive therapy and severity of symptoms will go ahead and treat empirically for ABRS with coverage for CAP. Encouraged him to notify me  if symptoms worsen or fail to improve rapidly with " this treatment.

## 2024-06-11 ENCOUNTER — OFFICE VISIT (OUTPATIENT)
Dept: NEPHROLOGY | Facility: CLINIC | Age: 59
End: 2024-06-11
Payer: MEDICAID

## 2024-06-11 VITALS
DIASTOLIC BLOOD PRESSURE: 82 MMHG | BODY MASS INDEX: 38.19 KG/M2 | WEIGHT: 237.63 LBS | RESPIRATION RATE: 18 BRPM | SYSTOLIC BLOOD PRESSURE: 126 MMHG | OXYGEN SATURATION: 94 % | HEART RATE: 63 BPM | HEIGHT: 66 IN

## 2024-06-11 DIAGNOSIS — I10 ESSENTIAL HYPERTENSION: ICD-10-CM

## 2024-06-11 DIAGNOSIS — I12.9 HYPERTENSIVE NEPHROSCLEROSIS, STAGE 1 THROUGH STAGE 4 OR UNSPECIFIED CHRONIC KIDNEY DISEASE: Primary | ICD-10-CM

## 2024-06-11 DIAGNOSIS — N18.32 STAGE 3B CHRONIC KIDNEY DISEASE: ICD-10-CM

## 2024-06-11 PROCEDURE — 3074F SYST BP LT 130 MM HG: CPT | Mod: CPTII,,, | Performed by: INTERNAL MEDICINE

## 2024-06-11 PROCEDURE — 99215 OFFICE O/P EST HI 40 MIN: CPT | Mod: PBBFAC | Performed by: INTERNAL MEDICINE

## 2024-06-11 PROCEDURE — 3079F DIAST BP 80-89 MM HG: CPT | Mod: CPTII,,, | Performed by: INTERNAL MEDICINE

## 2024-06-11 PROCEDURE — 3066F NEPHROPATHY DOC TX: CPT | Mod: CPTII,,, | Performed by: INTERNAL MEDICINE

## 2024-06-11 PROCEDURE — 99204 OFFICE O/P NEW MOD 45 MIN: CPT | Mod: S$PBB,,, | Performed by: INTERNAL MEDICINE

## 2024-06-11 PROCEDURE — 1159F MED LIST DOCD IN RCRD: CPT | Mod: CPTII,,, | Performed by: INTERNAL MEDICINE

## 2024-06-11 PROCEDURE — 3008F BODY MASS INDEX DOCD: CPT | Mod: CPTII,,, | Performed by: INTERNAL MEDICINE

## 2024-06-11 RX ORDER — TOFACITINIB 5 MG/1
1 TABLET, FILM COATED ORAL 2 TIMES DAILY
COMMUNITY
Start: 2024-04-03

## 2024-06-11 RX ORDER — DEXTROMETHORPHAN HBR, GUAIFENESIN 20; 200 MG/10ML; MG/10ML
LIQUID ORAL
COMMUNITY
Start: 2024-04-08 | End: 2024-06-11

## 2024-06-11 RX ORDER — VALSARTAN 160 MG/1
160 TABLET ORAL DAILY
Qty: 90 TABLET | Refills: 3 | Status: SHIPPED | OUTPATIENT
Start: 2024-06-11 | End: 2025-06-11

## 2024-06-11 NOTE — PROGRESS NOTES
[facility] History and Physical  Patient Name: Clay Curtis  MRN: [id number]  Age: 58 y.o.  : 1965    Date: 2024 1:33 PM    Chief Complaint: Establish Care    HPI :   Mr Curtis presents to Nephrology clinic to establish care. He is followed by Dr. Jolanta MD as his primary care provider.     HTN: diagnosed in . Current regimen includes amlodipine 10 mg daily, hydralazine 25 mg 3 times a day, hydrochlorothiazide 12.5 mg daily. Well controlled.     Rheumatoid /Osteoarthritis: follows with [facility]. Xeljanz.     Gout: allopurinol.     Nephrology history: No FH of kidney disease, no nephrolithiasis, or recurrent UTIs. No OTC medications including NSAIDs. Used to take advil as needed for pain.     Patient denies any CP, SOB, peripheral edema, dysuria, hematuria, changes in urinary habits, or increased frequency of urination.     Past Medical History:  has a past medical history of Hypertension, Mixed hyperlipidemia, and Oligoarthritis (2022).     Past Surgical History:   has a past surgical history that includes Cardiac surgery.     Family History:  family history includes Diabetes in his brother, mother, and sister; Hypertension in his brother, mother, and sister. No family history of kidney disease.     Social History:   reports that he has never smoked. He has never been exposed to tobacco smoke. His smokeless tobacco use includes snuff. He reports that he does not currently use alcohol after a past usage of about 12.0 standard drinks of alcohol per week. He reports that he does not currently use drugs.     Allergies: is allergic to tropicamide and atorvastatin.     Medications: Reviewed including OTC medications, herbal supplements, and NSAIDS.     Old records have been reviewed.      Review of Systems:  ROS: A 10 point ROS was completed and found to be negative except for that mentioned above.          Physical Exam:  Vitals:     06/11/24 1311   BP: 126/82   Pulse: 63   Resp: 18       Constitutional: sitting in chair, in NAD  Eyes: EOMI, white sclera  ENMT: moist mucus membranes, nares patent  Cardiovascular: normal rate, S1/S2 noted, no edema  Respiratory: symmetrical chest expansion, CTA-B  Gastrointestinal: +BS, soft, NT/ND  Musculoskeletal: normal, no joint erythema/effusions  Skin: no rash, no purpura, warm extremities  Neurological: Alert and Oriented x 4, afocal    Labs:   Lab Results   Component Value Date     04/23/2024    K 4.4 04/23/2024    CREATININE 1.63 (H) 04/23/2024    ALT 26 04/23/2024    AST 18 04/23/2024    HGBA1C 6.2 10/04/2023    LDLCALC 152 04/23/2024    CHOL 244 (H) 04/23/2024    HDL 73 (H) 04/23/2024    TRIG 93 04/23/2024    TSH 3.950 (H) 04/23/2024    WBC 6.64 04/23/2024    HGB 15.0 04/23/2024    HCT 47.6 04/23/2024     04/23/2024        Otherwise Reviewed    Assessment/Plan:       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 Baseline sCr 1.4-1.7, today sCr pending. Counseled to avoid nephrotoxic agents such as NSAIDs.     Proteinuria: urine Prot:Creat ratio is pending. Patient is not on RAAS blockade     Anemia: CBC pending    BMD: Calcium and Phosphorus PTH, Vit D pending    HTN: well controlled with current meds. Stop hydralazine. Start valsartan 160 mg daily for HTN, CKD.    RTC 3 months with CBC, RFP, UA, urine for Prot:creat ratio, PTH, Vit D      Alisha S. Parker, DO [facility]   06/11/2024

## 2024-06-17 DIAGNOSIS — E55.9 VITAMIN D DEFICIENCY, UNSPECIFIED: Primary | ICD-10-CM

## 2024-06-17 RX ORDER — ERGOCALCIFEROL 1.25 MG/1
50000 CAPSULE ORAL
Qty: 12 CAPSULE | Refills: 0 | Status: SHIPPED | OUTPATIENT
Start: 2024-06-17 | End: 2024-09-03

## 2024-06-17 NOTE — TELEPHONE ENCOUNTER
----- Message from Carley Church DO sent at 6/17/2024  9:49 AM CDT -----  Renal function stable. Some protein in his urine which his valsartan should help with. Vit D deficiency please send ergo TY

## 2024-07-16 DIAGNOSIS — I10 HYPERTENSION, UNSPECIFIED TYPE: ICD-10-CM

## 2024-07-17 RX ORDER — HYDROCHLOROTHIAZIDE 12.5 MG/1
12.5 CAPSULE ORAL
Qty: 30 CAPSULE | Refills: 5 | Status: SHIPPED | OUTPATIENT
Start: 2024-07-17

## 2024-07-22 ENCOUNTER — OFFICE VISIT (OUTPATIENT)
Dept: FAMILY MEDICINE | Facility: CLINIC | Age: 59
End: 2024-07-22
Payer: MEDICAID

## 2024-07-22 VITALS
DIASTOLIC BLOOD PRESSURE: 84 MMHG | SYSTOLIC BLOOD PRESSURE: 125 MMHG | BODY MASS INDEX: 37.41 KG/M2 | HEIGHT: 66 IN | OXYGEN SATURATION: 97 % | HEART RATE: 62 BPM | TEMPERATURE: 98 F | WEIGHT: 232.81 LBS

## 2024-07-22 DIAGNOSIS — M1A.09X0 IDIOPATHIC CHRONIC GOUT OF MULTIPLE SITES WITHOUT TOPHUS: ICD-10-CM

## 2024-07-22 DIAGNOSIS — E66.01 MORBID OBESITY: ICD-10-CM

## 2024-07-22 DIAGNOSIS — E79.0 ELEVATED BLOOD URIC ACID LEVEL: ICD-10-CM

## 2024-07-22 DIAGNOSIS — R73.9 HYPERGLYCEMIA: ICD-10-CM

## 2024-07-22 DIAGNOSIS — I10 PRIMARY HYPERTENSION: Primary | ICD-10-CM

## 2024-07-22 DIAGNOSIS — Z13.220 SCREENING FOR HYPERLIPIDEMIA: ICD-10-CM

## 2024-07-22 DIAGNOSIS — R73.03 PREDIABETES: ICD-10-CM

## 2024-07-22 DIAGNOSIS — M06.9 RHEUMATOID ARTHRITIS, INVOLVING UNSPECIFIED SITE, UNSPECIFIED WHETHER RHEUMATOID FACTOR PRESENT: ICD-10-CM

## 2024-07-22 LAB
ANION GAP SERPL CALCULATED.3IONS-SCNC: 7 MMOL/L (ref 7–16)
BUN SERPL-MCNC: 19 MG/DL (ref 7–18)
BUN/CREAT SERPL: 11 (ref 6–20)
CALCIUM SERPL-MCNC: 9.8 MG/DL (ref 8.5–10.1)
CHLORIDE SERPL-SCNC: 109 MMOL/L (ref 98–107)
CHOLEST SERPL-MCNC: 197 MG/DL (ref 0–200)
CHOLEST/HDLC SERPL: 4.4 {RATIO}
CO2 SERPL-SCNC: 30 MMOL/L (ref 21–32)
CREAT SERPL-MCNC: 1.76 MG/DL (ref 0.7–1.3)
EGFR (NO RACE VARIABLE) (RUSH/TITUS): 44 ML/MIN/1.73M2
EST. AVERAGE GLUCOSE BLD GHB EST-MCNC: 134 MG/DL
GLUCOSE SERPL-MCNC: 96 MG/DL (ref 74–106)
HBA1C MFR BLD HPLC: 6.3 % (ref 4.5–6.6)
HDLC SERPL-MCNC: 45 MG/DL (ref 40–60)
LDLC SERPL CALC-MCNC: 131 MG/DL
LDLC/HDLC SERPL: 2.9 {RATIO}
NONHDLC SERPL-MCNC: 152 MG/DL
POTASSIUM SERPL-SCNC: 4.6 MMOL/L (ref 3.5–5.1)
SODIUM SERPL-SCNC: 141 MMOL/L (ref 136–145)
TRIGL SERPL-MCNC: 104 MG/DL (ref 35–150)
URATE SERPL-MCNC: 8.3 MG/DL (ref 3.5–7.2)
VLDLC SERPL-MCNC: 21 MG/DL

## 2024-07-22 PROCEDURE — 4010F ACE/ARB THERAPY RXD/TAKEN: CPT | Mod: CPTII,,, | Performed by: STUDENT IN AN ORGANIZED HEALTH CARE EDUCATION/TRAINING PROGRAM

## 2024-07-22 PROCEDURE — 99213 OFFICE O/P EST LOW 20 MIN: CPT | Mod: ,,, | Performed by: STUDENT IN AN ORGANIZED HEALTH CARE EDUCATION/TRAINING PROGRAM

## 2024-07-22 PROCEDURE — 3079F DIAST BP 80-89 MM HG: CPT | Mod: CPTII,,, | Performed by: STUDENT IN AN ORGANIZED HEALTH CARE EDUCATION/TRAINING PROGRAM

## 2024-07-22 PROCEDURE — 3066F NEPHROPATHY DOC TX: CPT | Mod: CPTII,,, | Performed by: STUDENT IN AN ORGANIZED HEALTH CARE EDUCATION/TRAINING PROGRAM

## 2024-07-22 PROCEDURE — 1159F MED LIST DOCD IN RCRD: CPT | Mod: CPTII,,, | Performed by: STUDENT IN AN ORGANIZED HEALTH CARE EDUCATION/TRAINING PROGRAM

## 2024-07-22 PROCEDURE — 3074F SYST BP LT 130 MM HG: CPT | Mod: CPTII,,, | Performed by: STUDENT IN AN ORGANIZED HEALTH CARE EDUCATION/TRAINING PROGRAM

## 2024-07-22 PROCEDURE — 83036 HEMOGLOBIN GLYCOSYLATED A1C: CPT | Mod: ,,, | Performed by: CLINICAL MEDICAL LABORATORY

## 2024-07-22 PROCEDURE — 80048 BASIC METABOLIC PNL TOTAL CA: CPT | Mod: ,,, | Performed by: CLINICAL MEDICAL LABORATORY

## 2024-07-22 PROCEDURE — 3060F POS MICROALBUMINURIA REV: CPT | Mod: CPTII,,, | Performed by: STUDENT IN AN ORGANIZED HEALTH CARE EDUCATION/TRAINING PROGRAM

## 2024-07-22 PROCEDURE — 1160F RVW MEDS BY RX/DR IN RCRD: CPT | Mod: CPTII,,, | Performed by: STUDENT IN AN ORGANIZED HEALTH CARE EDUCATION/TRAINING PROGRAM

## 2024-07-22 PROCEDURE — 84550 ASSAY OF BLOOD/URIC ACID: CPT | Mod: ,,, | Performed by: CLINICAL MEDICAL LABORATORY

## 2024-07-22 PROCEDURE — 80061 LIPID PANEL: CPT | Mod: ,,, | Performed by: CLINICAL MEDICAL LABORATORY

## 2024-07-22 PROCEDURE — 3008F BODY MASS INDEX DOCD: CPT | Mod: CPTII,,, | Performed by: STUDENT IN AN ORGANIZED HEALTH CARE EDUCATION/TRAINING PROGRAM

## 2024-07-22 RX ORDER — ALLOPURINOL 100 MG/1
100 TABLET ORAL DAILY
Qty: 30 TABLET | Refills: 5 | Status: SHIPPED | OUTPATIENT
Start: 2024-07-22 | End: 2024-07-25

## 2024-07-22 NOTE — PROGRESS NOTES
"Subjective     Patient ID: Clay Curtis is a 59 y.o. male.    Chief Complaint: Follow-up (Saw nephrologist last month)    HPI      Stopped taking Xeljanz because his pain was better and he thought he could stop it. He stopped this about a month ago.  He is not taking allopurinol for elevated uric acid.     Has lost about 5 lbs since his last encounter in June. No longer taking prednisone.    Dr. Church thankfully stopped hydralazine and started valsartan 160 mg daily, which he has been taking. He is also still taking amlodipine 10 and HCTZ 12.5.    feeling well with few concerns.        Objective   /84   Pulse 62   Temp 98 °F (36.7 °C)   Ht 5' 6" (1.676 m)   Wt 105.6 kg (232 lb 12.8 oz)   SpO2 97%   BMI 37.57 kg/m²     Wt Readings from Last 3 Encounters:   07/22/24 0908 105.6 kg (232 lb 12.8 oz)   06/11/24 1311 107.8 kg (237 lb 9.6 oz)   04/08/24 0934 99.9 kg (220 lb 3.2 oz)       Physical Exam    Gen: well-groomed, well-dressed. No apparent distress  HEENT:  NCAT, symmetric  Pulm: normal work of breathing, no accessory muscle use; speaking complete sentences without having to stop  Neuro: CN II-XII grossly normal   Psych: pleasant affect, congruent mood. Linear thought; good eye contact  SKIN: no rashes present on face, neck, hands       Assessment and Plan     Problem List Items Addressed This Visit       Hypertension - Primary    Relevant Orders    Basic Metabolic Panel    Rheumatoid arthritis    Hyperglycemia    Relevant Orders    Hemoglobin A1C    Prediabetes    Relevant Orders    Hemoglobin A1C    Morbid obesity     Other Visit Diagnoses       Screening for hyperlipidemia        Elevated blood uric acid level              HTN - The current medical regimen is effective;  continue present plan and medications.  RA - stopped taking Xeljanz; RA symptoms controlled but advised him to resume this to prevent flares  Prediabetes - Last A1c in March around 6%; recheck to day  Morbid obesity - discussed " the role his chronic use of prednisone has played in his weight gain and insulin resistance; he also tells me diabetes runs in his family. Discussed diet and exercise with him and encouraged him to keep working on weight loss.

## 2024-07-25 ENCOUNTER — TELEPHONE (OUTPATIENT)
Dept: FAMILY MEDICINE | Facility: CLINIC | Age: 59
End: 2024-07-25
Payer: MEDICAID

## 2024-07-25 DIAGNOSIS — M1A.09X0 IDIOPATHIC CHRONIC GOUT OF MULTIPLE SITES WITHOUT TOPHUS: ICD-10-CM

## 2024-07-25 RX ORDER — ALLOPURINOL 200 MG/1
200 TABLET ORAL DAILY
Qty: 90 TABLET | Refills: 1 | Status: SHIPPED | OUTPATIENT
Start: 2024-07-25

## 2024-07-26 ENCOUNTER — TELEPHONE (OUTPATIENT)
Dept: FAMILY MEDICINE | Facility: CLINIC | Age: 59
End: 2024-07-26
Payer: MEDICAID

## 2024-07-26 NOTE — TELEPHONE ENCOUNTER
----- Message from Dmitri Stover MD sent at 7/25/2024  3:18 PM CDT -----  Please contact the patient and let them know that his kidney function is stable; keep working on weight loss. If he has been taking allopurinol 100 mg daily for about a week, it is time to increase to 200 mg daily. This is to prevent gout.

## 2024-07-30 ENCOUNTER — OFFICE VISIT (OUTPATIENT)
Dept: FAMILY MEDICINE | Facility: CLINIC | Age: 59
End: 2024-07-30
Payer: MEDICAID

## 2024-07-30 VITALS
DIASTOLIC BLOOD PRESSURE: 78 MMHG | RESPIRATION RATE: 20 BRPM | WEIGHT: 228.81 LBS | OXYGEN SATURATION: 94 % | HEART RATE: 76 BPM | TEMPERATURE: 99 F | SYSTOLIC BLOOD PRESSURE: 111 MMHG | HEIGHT: 66 IN | BODY MASS INDEX: 36.77 KG/M2

## 2024-07-30 DIAGNOSIS — U07.1 COVID-19 VIRUS DETECTED: ICD-10-CM

## 2024-07-30 DIAGNOSIS — U07.1 COVID-19 VIRUS INFECTION: ICD-10-CM

## 2024-07-30 DIAGNOSIS — J06.9 VIRAL URI WITH COUGH: Primary | ICD-10-CM

## 2024-07-30 LAB
CTP QC/QA: YES
MOLECULAR STREP A: NEGATIVE
POC MOLECULAR INFLUENZA A AGN: NEGATIVE
POC MOLECULAR INFLUENZA B AGN: NEGATIVE
SARS-COV-2 RDRP RESP QL NAA+PROBE: POSITIVE

## 2024-07-30 PROCEDURE — 3008F BODY MASS INDEX DOCD: CPT | Mod: CPTII,,, | Performed by: FAMILY MEDICINE

## 2024-07-30 PROCEDURE — 87502 INFLUENZA DNA AMP PROBE: CPT | Mod: RHCUB | Performed by: FAMILY MEDICINE

## 2024-07-30 PROCEDURE — 87635 SARS-COV-2 COVID-19 AMP PRB: CPT | Mod: RHCUB | Performed by: FAMILY MEDICINE

## 2024-07-30 PROCEDURE — 87651 STREP A DNA AMP PROBE: CPT | Mod: RHCUB | Performed by: FAMILY MEDICINE

## 2024-07-30 PROCEDURE — 3060F POS MICROALBUMINURIA REV: CPT | Mod: CPTII,,, | Performed by: FAMILY MEDICINE

## 2024-07-30 PROCEDURE — 3074F SYST BP LT 130 MM HG: CPT | Mod: CPTII,,, | Performed by: FAMILY MEDICINE

## 2024-07-30 PROCEDURE — 3066F NEPHROPATHY DOC TX: CPT | Mod: CPTII,,, | Performed by: FAMILY MEDICINE

## 2024-07-30 PROCEDURE — 1159F MED LIST DOCD IN RCRD: CPT | Mod: CPTII,,, | Performed by: FAMILY MEDICINE

## 2024-07-30 PROCEDURE — 3078F DIAST BP <80 MM HG: CPT | Mod: CPTII,,, | Performed by: FAMILY MEDICINE

## 2024-07-30 PROCEDURE — 3044F HG A1C LEVEL LT 7.0%: CPT | Mod: CPTII,,, | Performed by: FAMILY MEDICINE

## 2024-07-30 PROCEDURE — 4010F ACE/ARB THERAPY RXD/TAKEN: CPT | Mod: CPTII,,, | Performed by: FAMILY MEDICINE

## 2024-07-30 PROCEDURE — 99214 OFFICE O/P EST MOD 30 MIN: CPT | Mod: ,,, | Performed by: FAMILY MEDICINE

## 2024-07-30 RX ORDER — ACETAMINOPHEN 500 MG
500 TABLET ORAL EVERY 6 HOURS PRN
Qty: 60 TABLET | Refills: 1 | Status: SHIPPED | OUTPATIENT
Start: 2024-07-30

## 2024-07-30 RX ORDER — LORATADINE 10 MG/1
10 TABLET ORAL DAILY
Qty: 30 TABLET | Refills: 3 | Status: SHIPPED | OUTPATIENT
Start: 2024-07-30

## 2024-07-30 NOTE — PROGRESS NOTES
Stacey Lea DO        PATIENT NAME: Clay Curtis  : 1965  DATE: 24  MRN: [id number]      Reason for Visit / Chief Complaint: Chills (Body aches and chills since last night) and Cough (Productive cough- since last night )     History of Present Illness / Problem Focused Workflow     Clay Curtis presents to the clinic with Chills (Body aches and chills since last night) and Cough (Productive cough- since last night )     Presents her for productive cough, bodyaches and chills which started last night   Denies any known sick contacts. Does report he was sitting next to someone at [employer] who might have been sick   Denies any fevers, shortness of breath, or chest pain   PMH significant for RA on Xeljanz, CKD, HTN, and gout         Review of Systems     Review of Systems   Constitutional:  Negative for chills and fever.   Respiratory:  Positive for cough. Negative for shortness of breath.    Cardiovascular:  Negative for chest pain.   Gastrointestinal:  Negative for abdominal pain, nausea and vomiting.   Genitourinary:  Negative for difficulty urinating.   Neurological:  Negative for dizziness and headaches.        Medical / Social / Family History     Past Medical History:   Diagnosis Date    Hypertension     Mixed hyperlipidemia     Oligoarthritis 2022       Past Surgical History:   Procedure Laterality Date    CARDIAC SURGERY         Social History  Mr. Clay Curtis  reports that he has never smoked. He has never been exposed to tobacco smoke. His smokeless tobacco use includes snuff. He reports that he does not currently use alcohol after a past usage of about 12.0 standard drinks of alcohol per week. He reports that he does not currently use drugs.    Family History  Mr. Clay Curtis's family history includes Diabetes in his brother, mother, and sister; Hypertension in his brother, mother, and sister.    Medications and Allergies     Medications  Outpatient Medications  Marked as Taking for the 7/30/24 encounter (Office Visit) with Stacey Lea DO   Medication Sig Dispense Refill    allopurinoL 200 mg Tab Take 200 mg by mouth once daily. 90 tablet 1    amLODIPine (NORVASC) 10 MG tablet Take 1 tablet (10 mg total) by mouth once daily. 90 tablet 3    diclofenac sodium (VOLTAREN) 1 % Gel Apply 2 g topically 4 (four) times daily. 50 g 2    ergocalciferol (ERGOCALCIFEROL) 50,000 unit Cap Take 1 capsule (50,000 Units total) by mouth every 7 days. for 12 doses 12 capsule 0    hydroCHLOROthiazide (MICROZIDE) 12.5 mg capsule TAKE ONE CAPSULE BY MOUTH EVERY DAY 30 capsule 5    valsartan (DIOVAN) 160 MG tablet Take 1 tablet (160 mg total) by mouth once daily. 90 tablet 3       Allergies  Review of patient's allergies indicates:   Allergen Reactions    Tropicamide Swelling    Atorvastatin Itching     itching  itching         Physical Examination     Vitals:    07/30/24 0922   BP: 111/78   Pulse: 76   Resp: 20   Temp: 98.9 °F (37.2 °C)     Physical Exam  Constitutional:       General: He is not in acute distress.     Appearance: Normal appearance.   HENT:      Head: Normocephalic and atraumatic.   Cardiovascular:      Rate and Rhythm: Normal rate and regular rhythm.      Pulses: Normal pulses.      Heart sounds: No murmur heard.  Pulmonary:      Effort: Pulmonary effort is normal.      Breath sounds: Normal breath sounds. No wheezing, rhonchi or rales.   Neurological:      Mental Status: He is alert.          Assessment and Plan (including Health Maintenance)     Plan:   Viral URI with cough  COVID-19 virus infection  -     POCT Strep A, Molecular  -     POCT COVID-19 Rapid Screening  -     POCT Influenza A/B Molecular  - Stop Xeljanz. Start Paxlovid and Claritin daily for congestion.   - Follow up in 1 week if symptoms have not improved  - ER precautions discussed for any difficulty breathing, chest pain, persistent fevers despite antipyretics     Follow up if symptoms worsen or  fail to improve.     Signature:  Stacey Lea DO    Date of encounter: 7/30/24    Addendum: 08/01/2024 Called patient to see how he is doing. Patient on Claritin however reports worsening nasal congestion. Discussed starting Flonase nasal spray along with Claritin.     Dr. Venu DO

## 2024-07-30 NOTE — PROGRESS NOTES
Health Maintenance Due   Topic Date Due    TETANUS VACCINE  Never done    Shingles Vaccine (1 of 2) Never done    COVID-19 Vaccine (4 - 2023-24 season) 09/01/2023   \here for sick visit

## 2024-08-01 RX ORDER — NIRMATRELVIR AND RITONAVIR 300-100 MG
KIT ORAL
Qty: 30 TABLET | Refills: 0 | Status: SHIPPED | OUTPATIENT
Start: 2024-08-01 | End: 2024-08-06

## 2024-08-01 RX ORDER — FLUTICASONE PROPIONATE 50 MCG
1 SPRAY, SUSPENSION (ML) NASAL DAILY
Qty: 18.2 ML | Refills: 1 | Status: SHIPPED | OUTPATIENT
Start: 2024-08-01

## 2024-09-11 ENCOUNTER — TELEPHONE (OUTPATIENT)
Dept: NEPHROLOGY | Facility: CLINIC | Age: 59
End: 2024-09-11
Payer: MEDICAID

## 2024-09-11 NOTE — TELEPHONE ENCOUNTER
----- Message from Farida Velasco sent at 9/11/2024  3:12 PM CDT -----  Regarding: Pt. missed today's appt. and needs to schedule a new one  Who Called: Clay Curtis    Caller is requesting a sooner appointment.   When is the first available appointment?No availability  Options offered (Virtual Visit, Urgent Care): In office care        Preferred Method of Contact: Phone Call  Patient's Preferred Phone Number on File: [phone]     Additional Information: Pt. Missed today's appt. 09/11/2024 due to the expected bad weather. Pt. Wants an appt. Next week as a later morning like 10:30 AM or 11 AM if not available he prefers one around 1 PM.

## 2024-10-01 ENCOUNTER — OFFICE VISIT (OUTPATIENT)
Dept: FAMILY MEDICINE | Facility: CLINIC | Age: 59
End: 2024-10-01
Payer: MEDICAID

## 2024-10-01 VITALS
DIASTOLIC BLOOD PRESSURE: 75 MMHG | WEIGHT: 240.38 LBS | TEMPERATURE: 98 F | OXYGEN SATURATION: 98 % | BODY MASS INDEX: 38.63 KG/M2 | HEART RATE: 68 BPM | HEIGHT: 66 IN | SYSTOLIC BLOOD PRESSURE: 112 MMHG

## 2024-10-01 DIAGNOSIS — R63.5 UNEXPLAINED WEIGHT GAIN: Primary | ICD-10-CM

## 2024-10-01 PROCEDURE — 83880 ASSAY OF NATRIURETIC PEPTIDE: CPT | Mod: ,,, | Performed by: CLINICAL MEDICAL LABORATORY

## 2024-10-01 PROCEDURE — 3078F DIAST BP <80 MM HG: CPT | Mod: CPTII,,, | Performed by: STUDENT IN AN ORGANIZED HEALTH CARE EDUCATION/TRAINING PROGRAM

## 2024-10-01 PROCEDURE — 3060F POS MICROALBUMINURIA REV: CPT | Mod: CPTII,,, | Performed by: STUDENT IN AN ORGANIZED HEALTH CARE EDUCATION/TRAINING PROGRAM

## 2024-10-01 PROCEDURE — 1160F RVW MEDS BY RX/DR IN RCRD: CPT | Mod: CPTII,,, | Performed by: STUDENT IN AN ORGANIZED HEALTH CARE EDUCATION/TRAINING PROGRAM

## 2024-10-01 PROCEDURE — 80053 COMPREHEN METABOLIC PANEL: CPT | Mod: ,,, | Performed by: CLINICAL MEDICAL LABORATORY

## 2024-10-01 PROCEDURE — 1159F MED LIST DOCD IN RCRD: CPT | Mod: CPTII,,, | Performed by: STUDENT IN AN ORGANIZED HEALTH CARE EDUCATION/TRAINING PROGRAM

## 2024-10-01 PROCEDURE — 3008F BODY MASS INDEX DOCD: CPT | Mod: CPTII,,, | Performed by: STUDENT IN AN ORGANIZED HEALTH CARE EDUCATION/TRAINING PROGRAM

## 2024-10-01 PROCEDURE — 99213 OFFICE O/P EST LOW 20 MIN: CPT | Mod: ,,, | Performed by: STUDENT IN AN ORGANIZED HEALTH CARE EDUCATION/TRAINING PROGRAM

## 2024-10-01 PROCEDURE — 3074F SYST BP LT 130 MM HG: CPT | Mod: CPTII,,, | Performed by: STUDENT IN AN ORGANIZED HEALTH CARE EDUCATION/TRAINING PROGRAM

## 2024-10-01 PROCEDURE — 4010F ACE/ARB THERAPY RXD/TAKEN: CPT | Mod: CPTII,,, | Performed by: STUDENT IN AN ORGANIZED HEALTH CARE EDUCATION/TRAINING PROGRAM

## 2024-10-01 PROCEDURE — 84443 ASSAY THYROID STIM HORMONE: CPT | Mod: ,,, | Performed by: CLINICAL MEDICAL LABORATORY

## 2024-10-01 PROCEDURE — 3066F NEPHROPATHY DOC TX: CPT | Mod: CPTII,,, | Performed by: STUDENT IN AN ORGANIZED HEALTH CARE EDUCATION/TRAINING PROGRAM

## 2024-10-01 PROCEDURE — 3044F HG A1C LEVEL LT 7.0%: CPT | Mod: CPTII,,, | Performed by: STUDENT IN AN ORGANIZED HEALTH CARE EDUCATION/TRAINING PROGRAM

## 2024-10-01 PROCEDURE — 83036 HEMOGLOBIN GLYCOSYLATED A1C: CPT | Mod: ,,, | Performed by: CLINICAL MEDICAL LABORATORY

## 2024-10-01 NOTE — PROGRESS NOTES
"Subjective     Patient ID: Clay Curtis is a 59 y.o. male.    Chief Complaint: Hypertension (Patient here for follow up appointment)    HPI    Clay Curtis is a 59 y.o. patient    Interval weight gain; not on prednisone. Can't explain it. Check for insulin resistance     I really think he would benefit from pharmacologic Tx for weight gain     RA and gout are otherwise under control. Still taking xeljanz     Objective   /75 (BP Location: Left arm, Patient Position: Sitting)   Pulse 68   Temp 98.1 °F (36.7 °C) (Oral)   Ht 5' 6" (1.676 m)   Wt 109 kg (240 lb 6.4 oz)   SpO2 98%   BMI 38.80 kg/m²     Wt Readings from Last 3 Encounters:   10/07/24 0937 108.9 kg (240 lb)   10/01/24 1542 109 kg (240 lb 6.4 oz)   07/30/24 0922 103.8 kg (228 lb 12.8 oz)       Physical Exam  Gen: well-groomed, well-dressed. No apparent distress  HEENT:  NCAT, symmetric  Pulm: normal work of breathing, no accessory muscle use; speaking complete sentences without having to stop  Neuro: CN II-XII grossly normal   Psych: pleasant affect, congruent mood. Linear thought; good eye contact  SKIN: no rashes present on face, neck, hands       Assessment and Plan     Problem List Items Addressed This Visit    None  Visit Diagnoses       Unexplained weight gain    -  Primary    Relevant Orders    Hemoglobin A1C (Completed)    TSH (Completed)    Comprehensive Metabolic Panel (Completed)    NT-Pro Natriuretic Peptide (Completed)          Checking labs to evaluate for secondary causes.     "

## 2024-10-01 NOTE — PROGRESS NOTES
Health Maintenance Due   Topic Date Due    TETANUS VACCINE  Not interested    Shingles Vaccine (1 of 2) Not interested    Influenza Vaccine (1) 09/01/2024; interested    COVID-19 Vaccine (4 - 2024-25 season) 09/01/2024; interested

## 2024-10-02 LAB
ALBUMIN SERPL BCP-MCNC: 3.7 G/DL (ref 3.5–5)
ALBUMIN/GLOB SERPL: 0.9 {RATIO}
ALP SERPL-CCNC: 67 U/L (ref 45–115)
ALT SERPL W P-5'-P-CCNC: 30 U/L (ref 16–61)
ANION GAP SERPL CALCULATED.3IONS-SCNC: 9 MMOL/L (ref 7–16)
AST SERPL W P-5'-P-CCNC: 34 U/L (ref 15–37)
BILIRUB SERPL-MCNC: 0.3 MG/DL (ref ?–1.2)
BUN SERPL-MCNC: 34 MG/DL (ref 7–18)
BUN/CREAT SERPL: 16 (ref 6–20)
CALCIUM SERPL-MCNC: 9.5 MG/DL (ref 8.5–10.1)
CHLORIDE SERPL-SCNC: 107 MMOL/L (ref 98–107)
CO2 SERPL-SCNC: 28 MMOL/L (ref 21–32)
CREAT SERPL-MCNC: 2.12 MG/DL (ref 0.7–1.3)
EGFR (NO RACE VARIABLE) (RUSH/TITUS): 35 ML/MIN/1.73M2
GLOBULIN SER-MCNC: 4.3 G/DL (ref 2–4)
GLUCOSE SERPL-MCNC: 104 MG/DL (ref 74–106)
NT-PROBNP SERPL-MCNC: 39 PG/ML (ref 1–125)
POTASSIUM SERPL-SCNC: 4.1 MMOL/L (ref 3.5–5.1)
PROT SERPL-MCNC: 8 G/DL (ref 6.4–8.2)
SODIUM SERPL-SCNC: 140 MMOL/L (ref 136–145)
TSH SERPL DL<=0.005 MIU/L-ACNC: 4.04 UIU/ML (ref 0.36–3.74)

## 2024-10-03 LAB
EST. AVERAGE GLUCOSE BLD GHB EST-MCNC: 134 MG/DL
HBA1C MFR BLD HPLC: 6.3 % (ref 4.5–6.6)

## 2024-10-07 ENCOUNTER — OFFICE VISIT (OUTPATIENT)
Dept: NEPHROLOGY | Facility: CLINIC | Age: 59
End: 2024-10-07
Payer: MEDICAID

## 2024-10-07 VITALS
SYSTOLIC BLOOD PRESSURE: 120 MMHG | RESPIRATION RATE: 18 BRPM | HEART RATE: 60 BPM | HEIGHT: 66 IN | WEIGHT: 240 LBS | BODY MASS INDEX: 38.57 KG/M2 | DIASTOLIC BLOOD PRESSURE: 76 MMHG | OXYGEN SATURATION: 99 %

## 2024-10-07 DIAGNOSIS — N18.32 CKD STAGE 3B, GFR 30-44 ML/MIN: ICD-10-CM

## 2024-10-07 DIAGNOSIS — N18.32 STAGE 3B CHRONIC KIDNEY DISEASE: Primary | ICD-10-CM

## 2024-10-07 DIAGNOSIS — E13.69 OTHER SPECIFIED DIABETES MELLITUS WITH OTHER SPECIFIED COMPLICATION, WITHOUT LONG-TERM CURRENT USE OF INSULIN: Primary | ICD-10-CM

## 2024-10-07 DIAGNOSIS — R79.89 ELEVATED TSH: Primary | ICD-10-CM

## 2024-10-07 PROCEDURE — 99213 OFFICE O/P EST LOW 20 MIN: CPT | Mod: S$PBB,,, | Performed by: NURSE PRACTITIONER

## 2024-10-07 PROCEDURE — 99214 OFFICE O/P EST MOD 30 MIN: CPT | Mod: PBBFAC | Performed by: NURSE PRACTITIONER

## 2024-10-07 PROCEDURE — 99999 PR PBB SHADOW E&M-EST. PATIENT-LVL IV: CPT | Mod: PBBFAC,,, | Performed by: NURSE PRACTITIONER

## 2024-10-07 PROCEDURE — 3044F HG A1C LEVEL LT 7.0%: CPT | Mod: CPTII,,, | Performed by: NURSE PRACTITIONER

## 2024-10-07 PROCEDURE — 1159F MED LIST DOCD IN RCRD: CPT | Mod: CPTII,,, | Performed by: NURSE PRACTITIONER

## 2024-10-07 PROCEDURE — 3078F DIAST BP <80 MM HG: CPT | Mod: CPTII,,, | Performed by: NURSE PRACTITIONER

## 2024-10-07 PROCEDURE — 4010F ACE/ARB THERAPY RXD/TAKEN: CPT | Mod: CPTII,,, | Performed by: NURSE PRACTITIONER

## 2024-10-07 PROCEDURE — 3008F BODY MASS INDEX DOCD: CPT | Mod: CPTII,,, | Performed by: NURSE PRACTITIONER

## 2024-10-07 PROCEDURE — 3060F POS MICROALBUMINURIA REV: CPT | Mod: CPTII,,, | Performed by: NURSE PRACTITIONER

## 2024-10-07 PROCEDURE — 3074F SYST BP LT 130 MM HG: CPT | Mod: CPTII,,, | Performed by: NURSE PRACTITIONER

## 2024-10-07 PROCEDURE — 3066F NEPHROPATHY DOC TX: CPT | Mod: CPTII,,, | Performed by: NURSE PRACTITIONER

## 2024-10-07 NOTE — PROGRESS NOTES
[facility] History and Physical  Patient Name: Clay Curtis  MRN: [id number]  Age: 59 y.o.  : 1965    Date: 10/7/2024 1:33 PM    Chief Complaint: Follow Up    HPI :   Mr Curtis presents to Nephrology clinic for routine follow up. He is followed by Dr. Jolanta MD as his primary care provider.     HTN: diagnosed in . Current regimen includes amlodipine 10 mg daily, Valsartan 160 mg daily, hydrochlorothiazide 12.5 mg daily. Well controlled.     Rheumatoid /Osteoarthritis: follows with [facility]. Xeljanz.     Gout: allopurinol.     Nephrology history: No FH of kidney disease, no nephrolithiasis, or recurrent UTIs. No OTC medications including NSAIDs. Used to take advil as needed for pain.     Patient denies any CP, SOB, peripheral edema, dysuria, hematuria, changes in urinary habits, or increased frequency of urination.     Past Medical History:  has a past medical history of Hypertension, Mixed hyperlipidemia, and Oligoarthritis (2022).     Past Surgical History:   has a past surgical history that includes Cardiac surgery.     Family History:  family history includes Diabetes in his brother, mother, and sister; Hypertension in his brother, mother, and sister. No family history of kidney disease.     Social History:   reports that he has never smoked. He has never been exposed to tobacco smoke. His smokeless tobacco use includes snuff. He reports that he does not currently use alcohol after a past usage of about 12.0 standard drinks of alcohol per week. He reports that he does not currently use drugs.     Allergies: is allergic to tropicamide and atorvastatin.     Medications: Reviewed including OTC medications, herbal supplements, and NSAIDS.     Old records have been reviewed.      Review of Systems:  ROS: A 10 point ROS was completed and found to be negative except for that mentioned above.          Physical Exam:  Vitals:    10/07/24 0937    BP: 120/76   Pulse: 60   Resp: 18       Constitutional: sitting in chair, in NAD  Eyes: EOMI, white sclera  ENMT: moist mucus membranes, nares patent  Cardiovascular: normal rate, S1/S2 noted, no edema  Respiratory: symmetrical chest expansion, CTA-B  Gastrointestinal: +BS, soft, NT/ND  Musculoskeletal: normal, no joint erythema/effusions  Skin: no rash, no purpura, warm extremities  Neurological: Alert and Oriented x 3, afocal    Labs:   Lab Results   Component Value Date     10/01/2024    K 4.1 10/01/2024    CREATININE 2.12 (H) 10/01/2024    ALT 30 10/01/2024    AST 34 10/01/2024    HGBA1C 6.3 10/01/2024    LDLCALC 131 07/22/2024    CHOL 197 07/22/2024    HDL 45 07/22/2024    TRIG 104 07/22/2024    TSH 4.040 (H) 10/01/2024    WBC 7.67 06/11/2024    HGB 14.3 06/11/2024    HCT 44.3 06/11/2024     06/11/2024        Otherwise Reviewed    Assessment/Plan:       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 Baseline sCr 1.4-1.7, today sCr pending. Counseled to avoid nephrotoxic agents such as NSAIDs.     Proteinuria: urine Prot:Creat ratio is pending. Patient is on RAAS blockade with ARB.    Anemia: CBC reviewed    BMD: Calcium and Phosphorus PTH, Vit D     HTN: well controlled with current meds. Started Valsartan 160 mg at last visit for CKD     RTC 4-6 months with CBC, RFP, UA, urine for Prot:creat ratio, PTH, Vit D      Signature:             ROYER Proctor  [facility]  [address] [facility] [address]  [phone]        20 minutes of total time spent on the encounter, which includes face to face time and non-face to face time preparing to see the patient (eg, review of tests), Obtaining and/or reviewing separately obtained history, Documenting clinical information in the electronic or other health record, Independently interpreting results (not separately reported) and communicating results to the patient/family/caregiver, or  Care coordination (not separately reported).       Date of encounter: 10/7/24

## 2024-10-09 NOTE — PROGRESS NOTES
Discussed with pt; will let us know about Jardiance cost  Coming next Tuesday @ 9 for labwork. Isotretinoin Counseling: Patient should get monthly blood tests, not donate blood, not drive at night if vision affected, not share medication, and not undergo elective surgery for 6 months after tx completed. Side effects reviewed, pt to contact office should one occur.

## 2024-10-22 ENCOUNTER — OFFICE VISIT (OUTPATIENT)
Dept: FAMILY MEDICINE | Facility: CLINIC | Age: 59
End: 2024-10-22
Payer: MEDICAID

## 2024-10-22 VITALS
OXYGEN SATURATION: 98 % | TEMPERATURE: 98 F | HEART RATE: 58 BPM | WEIGHT: 243.19 LBS | HEIGHT: 66 IN | DIASTOLIC BLOOD PRESSURE: 68 MMHG | SYSTOLIC BLOOD PRESSURE: 105 MMHG | BODY MASS INDEX: 39.08 KG/M2

## 2024-10-22 DIAGNOSIS — E03.9 HYPOTHYROIDISM, UNSPECIFIED TYPE: Primary | ICD-10-CM

## 2024-10-22 DIAGNOSIS — R73.03 PREDIABETES: ICD-10-CM

## 2024-10-22 DIAGNOSIS — I10 HYPERTENSION, UNSPECIFIED TYPE: ICD-10-CM

## 2024-10-22 DIAGNOSIS — Z23 NEED FOR VACCINATION: ICD-10-CM

## 2024-10-22 PROCEDURE — 91322 SARSCOV2 VAC 50 MCG/0.5ML IM: CPT | Mod: ,,, | Performed by: STUDENT IN AN ORGANIZED HEALTH CARE EDUCATION/TRAINING PROGRAM

## 2024-10-22 PROCEDURE — 3066F NEPHROPATHY DOC TX: CPT | Mod: CPTII,,, | Performed by: STUDENT IN AN ORGANIZED HEALTH CARE EDUCATION/TRAINING PROGRAM

## 2024-10-22 PROCEDURE — 3060F POS MICROALBUMINURIA REV: CPT | Mod: CPTII,,, | Performed by: STUDENT IN AN ORGANIZED HEALTH CARE EDUCATION/TRAINING PROGRAM

## 2024-10-22 PROCEDURE — 3074F SYST BP LT 130 MM HG: CPT | Mod: CPTII,,, | Performed by: STUDENT IN AN ORGANIZED HEALTH CARE EDUCATION/TRAINING PROGRAM

## 2024-10-22 PROCEDURE — 1159F MED LIST DOCD IN RCRD: CPT | Mod: CPTII,,, | Performed by: STUDENT IN AN ORGANIZED HEALTH CARE EDUCATION/TRAINING PROGRAM

## 2024-10-22 PROCEDURE — 3008F BODY MASS INDEX DOCD: CPT | Mod: CPTII,,, | Performed by: STUDENT IN AN ORGANIZED HEALTH CARE EDUCATION/TRAINING PROGRAM

## 2024-10-22 PROCEDURE — 99213 OFFICE O/P EST LOW 20 MIN: CPT | Mod: 25,,, | Performed by: STUDENT IN AN ORGANIZED HEALTH CARE EDUCATION/TRAINING PROGRAM

## 2024-10-22 PROCEDURE — 90656 IIV3 VACC NO PRSV 0.5 ML IM: CPT | Mod: ,,, | Performed by: STUDENT IN AN ORGANIZED HEALTH CARE EDUCATION/TRAINING PROGRAM

## 2024-10-22 PROCEDURE — 1160F RVW MEDS BY RX/DR IN RCRD: CPT | Mod: CPTII,,, | Performed by: STUDENT IN AN ORGANIZED HEALTH CARE EDUCATION/TRAINING PROGRAM

## 2024-10-22 PROCEDURE — 90471 IMMUNIZATION ADMIN: CPT | Mod: ,,, | Performed by: STUDENT IN AN ORGANIZED HEALTH CARE EDUCATION/TRAINING PROGRAM

## 2024-10-22 PROCEDURE — 4010F ACE/ARB THERAPY RXD/TAKEN: CPT | Mod: CPTII,,, | Performed by: STUDENT IN AN ORGANIZED HEALTH CARE EDUCATION/TRAINING PROGRAM

## 2024-10-22 PROCEDURE — 3078F DIAST BP <80 MM HG: CPT | Mod: CPTII,,, | Performed by: STUDENT IN AN ORGANIZED HEALTH CARE EDUCATION/TRAINING PROGRAM

## 2024-10-22 PROCEDURE — 3044F HG A1C LEVEL LT 7.0%: CPT | Mod: CPTII,,, | Performed by: STUDENT IN AN ORGANIZED HEALTH CARE EDUCATION/TRAINING PROGRAM

## 2024-10-22 PROCEDURE — 90480 ADMN SARSCOV2 VAC 1/ONLY CMP: CPT | Mod: ,,, | Performed by: STUDENT IN AN ORGANIZED HEALTH CARE EDUCATION/TRAINING PROGRAM

## 2024-10-22 RX ORDER — AMLODIPINE BESYLATE 10 MG/1
10 TABLET ORAL DAILY
Qty: 90 TABLET | Refills: 3 | Status: SHIPPED | OUTPATIENT
Start: 2024-10-22 | End: 2025-10-22

## 2024-10-22 RX ORDER — LEVOTHYROXINE SODIUM 50 UG/1
50 TABLET ORAL
Qty: 30 TABLET | Refills: 11 | Status: SHIPPED | OUTPATIENT
Start: 2024-10-22 | End: 2025-10-22

## 2024-10-22 RX ORDER — HYDROCHLOROTHIAZIDE 12.5 MG/1
12.5 CAPSULE ORAL DAILY
Qty: 30 CAPSULE | Refills: 5 | Status: SHIPPED | OUTPATIENT
Start: 2024-10-22 | End: 2025-04-20

## 2024-10-22 NOTE — PATIENT INSTRUCTIONS
How and when to take levothyroxine (thyroid medicine)    . Take levothyroxine once a day in the morning, ideally at least 30 minutes before having breakfast or a drink containing caffeine, like tea or coffee. Food and caffeinated drinks can both stop your body taking in levothyroxine properly so it does not work as well.    1. About levothyroxine  Levothyroxine is a medicine used to treat an underactive thyroid gland (hypothyroidism).  The thyroid gland makes thyroid hormones which help to control energy levels and growth. Levothyroxine is taken to replace the missing thyroid hormone thyroxine.  Levothyroxine is only available on prescription. It comes as tablets or as a liquid that you swallow.    2. Key facts  Levothyroxine is a synthetic version of a hormone called thyroxine. It replaces thyroxine if your thyroid gland cannot produce it and prevents the symptoms of hypothyroidism.  Levothyroxine starts working straight away, but it may be several weeks before your symptoms start to improve.  The most common side effects of levothyroxine are caused by taking a bigger dose than you need. Your doctor can lower your dose to help reduce any side effects.  Before you start taking levothyroxine, your doctor will do a blood test. Once you start taking the medicine you'll have regular blood tests to see how well it's working.  Levothyroxine doses need to be carefully monitored during pregnancy. If you're planning to become pregnant or think you may be pregnant, it's important to talk to your doctor to get the right care for you and your baby.    3. Who can and cannot take levothyroxine  Levothyroxine can be taken by most adults and children. However, its not suitable for some people.  Check with your doctor before taking levothyroxine if you:  have ever had an allergic reaction to levothyroxine or any other medicine  you have an overactive thyroid that produces too much thyroid hormone (thyrotoxicosis)  have a health  problem that affects your adrenal glands (your doctor will be able to tell you if you're not sure)  have a heart problem including angina, heart disease or heart failure  have high blood pressure  have ever had a heart attack  have diabetes - the dose of your diabetes medicine may need to change because levothyroxine can raise blood sugar levels    4. How and when to take levothyroxine  Take levothyroxine once a day in the morning, ideally at least 30 minutes before having breakfast or a drink containing caffeine, like tea or coffee.  Food and caffeinated drinks can both stop your body taking in levothyroxine properly so it does not work as well.  If you stop taking levothyroxine, your symptoms are likely to come back.      Dosage and strength  The dose of levothyroxine varies from person to person.  You may need to take several different tablets to make up your dose. Your doctor will tell you how many tablets to take each day.  Levothyroxine comes in 12.5 microgram, 25 microgram, 50 microgram, 75 microgram and 100 microgram tablets.  If you're taking levothyroxine as a liquid, 5ml can have 25 micrograms, 50 micrograms, 100 micrograms or 125micrograms in it.  Although starting doses are usually the same, the dose of levothyroxine you end up taking, or how quickly the dose is increased, depends on your symptoms, hormone levels, age and whether you have any other health problems.  Adults usually start with a dose between 50 micrograms and 100 micrograms taken once a day. This may be increased gradually over a few weeks to between 100 micrograms and 200 micrograms taken once a day.  Some people, such as over-50s or people with heart disease, may start on a lower dose.      How to take levothyroxine  Swallow the tablets whole with a drink of water.  Levothyroxine is available as a liquid for children and people who find it difficult to swallow tablets. Its available in different strengths.  If you or your child are  taking levothyroxine as a liquid, it will usually come with a plastic syringe or spoon to help you measure out the right dose.  If you do not have a syringe or measuring spoon, ask your pharmacist for one. Do not use a kitchen teaspoon as it will not give the right amount.    If you forget to take it  If you forget to take a dose, take it as soon as you remember, unless it's almost time for your next dose. In this case just skip the forgotten dose and take the next one at the usual time.  Do not take 2 doses together to make up for a missed dose.  If you often forget doses, it may help to set an alarm to remind you. You could also ask your pharmacist for advice on other ways to help you remember to take your medicine.    If you take too much  Taking more than your prescribed dose of levothyroxine can give you symptoms such as a racing heartbeat (palpitations).

## 2024-10-22 NOTE — PROGRESS NOTES
Subjective     Patient ID: Clay Curtis is a 59 y.o. male.    Chief Complaint: Thyroid Problem (Followup to discuss lab results), Diabetes (Insurance paid for Jardiance so pt is taking), and Flu Vaccine (Pt requests flu and covid vaccines today)    HPI    Clay Curtis is a 59 y.o. patient with the medical problems listed below who comes to clinic to f/u on recent lab results. Hypothyroid on labs and need to discuss starting thyroid hormone replacement.            Objective   Wt Readings from Last 3 Encounters:   10/22/24 0846 110.3 kg (243 lb 3.2 oz)   10/07/24 0937 108.9 kg (240 lb)   10/01/24 1542 109 kg (240 lb 6.4 oz)           Physical Exam    Gen: well-groomed, well-dressed. No apparent distress  HEENT:  NCAT, symmetric; no goiter; thyroid normal size with no nodule or tenderness  Pulm: normal work of breathing, no accessory muscle use; speaking complete sentences without having to stop  Neuro: CN II-XII grossly normal   Psych: pleasant affect, congruent mood. Linear thought; good eye contact  SKIN: no rashes present on face, neck, hands       Assessment and Plan     Problem List Items Addressed This Visit       Hypertension    Relevant Medications    amLODIPine (NORVASC) 10 MG tablet    hydroCHLOROthiazide (MICROZIDE) 12.5 mg capsule    Prediabetes     Other Visit Diagnoses       Hypothyroidism, unspecified type    -  Primary    Relevant Medications    levothyroxine (SYNTHROID) 50 MCG tablet    Need for vaccination        Relevant Medications    COVID-19 (Moderna) 50 mcg/0.5 mL IM vaccine (>/= 11 yo) 0.5 mL (Completed)    influenza (Flulaval, Fluzone, Fluarix) 45 mcg/0.5 mL IM vaccine (> or = 6 mo) 0.5 mL (Completed)          Etiology 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    Tolerating jardiance at low dose; continue for now.    BP is excellent today. continue current medical regimen

## 2024-12-03 ENCOUNTER — OFFICE VISIT (OUTPATIENT)
Dept: FAMILY MEDICINE | Facility: CLINIC | Age: 59
End: 2024-12-03
Payer: MEDICAID

## 2024-12-03 VITALS
TEMPERATURE: 98 F | WEIGHT: 242.38 LBS | OXYGEN SATURATION: 95 % | SYSTOLIC BLOOD PRESSURE: 118 MMHG | HEIGHT: 66 IN | DIASTOLIC BLOOD PRESSURE: 80 MMHG | HEART RATE: 73 BPM | BODY MASS INDEX: 38.95 KG/M2

## 2024-12-03 DIAGNOSIS — E13.69 OTHER SPECIFIED DIABETES MELLITUS WITH OTHER SPECIFIED COMPLICATION, WITHOUT LONG-TERM CURRENT USE OF INSULIN: ICD-10-CM

## 2024-12-03 DIAGNOSIS — M1A.09X0 IDIOPATHIC CHRONIC GOUT OF MULTIPLE SITES WITHOUT TOPHUS: ICD-10-CM

## 2024-12-03 DIAGNOSIS — R09.81 SINUS CONGESTION: Primary | ICD-10-CM

## 2024-12-03 DIAGNOSIS — H91.90 HEARING LOSS, UNSPECIFIED HEARING LOSS TYPE, UNSPECIFIED LATERALITY: ICD-10-CM

## 2024-12-03 DIAGNOSIS — N18.32 CKD STAGE 3B, GFR 30-44 ML/MIN: ICD-10-CM

## 2024-12-03 DIAGNOSIS — E03.9 HYPOTHYROIDISM, UNSPECIFIED TYPE: ICD-10-CM

## 2024-12-03 PROCEDURE — 3066F NEPHROPATHY DOC TX: CPT | Mod: CPTII,,, | Performed by: STUDENT IN AN ORGANIZED HEALTH CARE EDUCATION/TRAINING PROGRAM

## 2024-12-03 PROCEDURE — 1159F MED LIST DOCD IN RCRD: CPT | Mod: CPTII,,, | Performed by: STUDENT IN AN ORGANIZED HEALTH CARE EDUCATION/TRAINING PROGRAM

## 2024-12-03 PROCEDURE — 4010F ACE/ARB THERAPY RXD/TAKEN: CPT | Mod: CPTII,,, | Performed by: STUDENT IN AN ORGANIZED HEALTH CARE EDUCATION/TRAINING PROGRAM

## 2024-12-03 PROCEDURE — 99213 OFFICE O/P EST LOW 20 MIN: CPT | Mod: ,,, | Performed by: STUDENT IN AN ORGANIZED HEALTH CARE EDUCATION/TRAINING PROGRAM

## 2024-12-03 PROCEDURE — 3074F SYST BP LT 130 MM HG: CPT | Mod: CPTII,,, | Performed by: STUDENT IN AN ORGANIZED HEALTH CARE EDUCATION/TRAINING PROGRAM

## 2024-12-03 PROCEDURE — 3079F DIAST BP 80-89 MM HG: CPT | Mod: CPTII,,, | Performed by: STUDENT IN AN ORGANIZED HEALTH CARE EDUCATION/TRAINING PROGRAM

## 2024-12-03 PROCEDURE — 3044F HG A1C LEVEL LT 7.0%: CPT | Mod: CPTII,,, | Performed by: STUDENT IN AN ORGANIZED HEALTH CARE EDUCATION/TRAINING PROGRAM

## 2024-12-03 PROCEDURE — 1160F RVW MEDS BY RX/DR IN RCRD: CPT | Mod: CPTII,,, | Performed by: STUDENT IN AN ORGANIZED HEALTH CARE EDUCATION/TRAINING PROGRAM

## 2024-12-03 PROCEDURE — 3060F POS MICROALBUMINURIA REV: CPT | Mod: CPTII,,, | Performed by: STUDENT IN AN ORGANIZED HEALTH CARE EDUCATION/TRAINING PROGRAM

## 2024-12-03 PROCEDURE — 3008F BODY MASS INDEX DOCD: CPT | Mod: CPTII,,, | Performed by: STUDENT IN AN ORGANIZED HEALTH CARE EDUCATION/TRAINING PROGRAM

## 2024-12-03 RX ORDER — ALLOPURINOL 200 MG/1
200 TABLET ORAL DAILY
Qty: 30 TABLET | Refills: 5 | Status: SHIPPED | OUTPATIENT
Start: 2024-12-03 | End: 2025-06-01

## 2024-12-03 RX ORDER — LORATADINE 10 MG/1
10 TABLET ORAL DAILY
Qty: 30 TABLET | Refills: 3 | Status: SHIPPED | OUTPATIENT
Start: 2024-12-03 | End: 2025-06-01

## 2024-12-03 NOTE — PROGRESS NOTES
"Subjective     Patient ID: Clay Curtis is a 59 y.o. male.    Chief Complaint: Thyroid Problem (Followup; started medication in October)    HPI      Clay Curtis is a 59 y.o. patient with the medical problems listed below who comes to clinic   For follow up.     Taking levothyroxine daily but 1-2 AM; a little too soon for labs. Weight has remained stable.   I suspect we will need to go up.     Will f/u in 1 month for repeat labs.     Recent sCr stable a little over 2.      Objective     /80   Pulse 73   Temp 98 °F (36.7 °C)   Ht 5' 6" (1.676 m)   Wt 110 kg (242 lb 6.4 oz)   SpO2 95%   BMI 39.12 kg/m²     Wt Readings from Last 3 Encounters:   12/03/24 1454 110 kg (242 lb 6.4 oz)   10/22/24 0846 110.3 kg (243 lb 3.2 oz)   10/07/24 0937 108.9 kg (240 lb)         Physical Exam    Gen: well-groomed, well-dressed. No apparent distress  HEENT:  NCAT, symmetric  Pulm: normal work of breathing, no accessory muscle use; speaking complete sentences without having to stop  Neuro: CN II-XII grossly normal   Psych: pleasant affect, congruent mood. Linear thought; good eye contact  SKIN: no rashes present on face, neck, hands         Assessment and Plan     Problem List Items Addressed This Visit       Idiopathic chronic gout of multiple sites without tophus    Relevant Medications    allopurinoL 200 mg Tab     Other Visit Diagnoses       Sinus congestion    -  Primary    Relevant Medications    loratadine (CLARITIN) 10 mg tablet    Other specified diabetes mellitus with other specified complication, without long-term current use of insulin        Relevant Medications    empagliflozin (JARDIANCE) 10 mg tablet    CKD stage 3b, GFR 30-44 ml/min        Relevant Medications    empagliflozin (JARDIANCE) 10 mg tablet    Other Relevant Orders    Comprehensive Metabolic Panel    Hearing loss, unspecified hearing loss type, unspecified laterality        Relevant Orders    Ambulatory referral/consult to Audiology    " Hypothyroidism, unspecified type        Relevant Orders    TSH          Taking levothyroxine daily but 1-2 AM; a little too soon for labs. Weight has remained stable.   I suspect we will need to go up.     Will f/u in 1 month for repeat labs.     Recent sCr stable a little over 2.     BP at goal; The current medical regimen is effective;  continue present plan and medications.    Reports he has been hard of hearing for a while. Does a lot of hunting. Will refer to audiology.

## 2025-03-04 ENCOUNTER — OFFICE VISIT (OUTPATIENT)
Dept: FAMILY MEDICINE | Facility: CLINIC | Age: 60
End: 2025-03-04
Payer: MEDICAID

## 2025-03-04 VITALS
OXYGEN SATURATION: 95 % | BODY MASS INDEX: 39.51 KG/M2 | WEIGHT: 245.81 LBS | DIASTOLIC BLOOD PRESSURE: 72 MMHG | TEMPERATURE: 98 F | HEIGHT: 66 IN | SYSTOLIC BLOOD PRESSURE: 114 MMHG | HEART RATE: 67 BPM

## 2025-03-04 DIAGNOSIS — R73.03 PREDIABETES: Primary | ICD-10-CM

## 2025-03-04 DIAGNOSIS — I15.9 SECONDARY HYPERTENSION: ICD-10-CM

## 2025-03-04 DIAGNOSIS — N18.32 CKD STAGE 3B, GFR 30-44 ML/MIN: ICD-10-CM

## 2025-03-04 DIAGNOSIS — E03.9 HYPOTHYROIDISM, UNSPECIFIED TYPE: ICD-10-CM

## 2025-03-04 PROCEDURE — 83036 HEMOGLOBIN GLYCOSYLATED A1C: CPT | Mod: ,,, | Performed by: CLINICAL MEDICAL LABORATORY

## 2025-03-04 PROCEDURE — 1159F MED LIST DOCD IN RCRD: CPT | Mod: CPTII,,, | Performed by: STUDENT IN AN ORGANIZED HEALTH CARE EDUCATION/TRAINING PROGRAM

## 2025-03-04 PROCEDURE — 99213 OFFICE O/P EST LOW 20 MIN: CPT | Mod: ,,, | Performed by: STUDENT IN AN ORGANIZED HEALTH CARE EDUCATION/TRAINING PROGRAM

## 2025-03-04 PROCEDURE — 3008F BODY MASS INDEX DOCD: CPT | Mod: CPTII,,, | Performed by: STUDENT IN AN ORGANIZED HEALTH CARE EDUCATION/TRAINING PROGRAM

## 2025-03-04 PROCEDURE — 3078F DIAST BP <80 MM HG: CPT | Mod: CPTII,,, | Performed by: STUDENT IN AN ORGANIZED HEALTH CARE EDUCATION/TRAINING PROGRAM

## 2025-03-04 PROCEDURE — 3044F HG A1C LEVEL LT 7.0%: CPT | Mod: CPTII,,, | Performed by: STUDENT IN AN ORGANIZED HEALTH CARE EDUCATION/TRAINING PROGRAM

## 2025-03-04 PROCEDURE — 3074F SYST BP LT 130 MM HG: CPT | Mod: CPTII,,, | Performed by: STUDENT IN AN ORGANIZED HEALTH CARE EDUCATION/TRAINING PROGRAM

## 2025-03-04 PROCEDURE — 4010F ACE/ARB THERAPY RXD/TAKEN: CPT | Mod: CPTII,,, | Performed by: STUDENT IN AN ORGANIZED HEALTH CARE EDUCATION/TRAINING PROGRAM

## 2025-03-04 PROCEDURE — 1160F RVW MEDS BY RX/DR IN RCRD: CPT | Mod: CPTII,,, | Performed by: STUDENT IN AN ORGANIZED HEALTH CARE EDUCATION/TRAINING PROGRAM

## 2025-03-04 PROCEDURE — 80053 COMPREHEN METABOLIC PANEL: CPT | Mod: ,,, | Performed by: CLINICAL MEDICAL LABORATORY

## 2025-03-04 PROCEDURE — 84443 ASSAY THYROID STIM HORMONE: CPT | Mod: ,,, | Performed by: CLINICAL MEDICAL LABORATORY

## 2025-03-04 RX ORDER — ALLOPURINOL 100 MG/1
200 TABLET ORAL DAILY
COMMUNITY
Start: 2025-02-04

## 2025-03-04 NOTE — PROGRESS NOTES
"Subjective     Patient ID: Clay Curtis is a 59 y.o. male.    Chief Complaint: Diabetes, Thyroid Problem, and Hypertension (3 month followup/Pt reports no weight loss since starting thyroid medication)    HPI    Clay Curtis is a 59 y.o. patient with the medical problems listed below who comes to clinic   for follow-up for their chronic medical conditions. feeling well with few concerns.        Has not lost any weight.      Objective   /72   Pulse 67   Temp 97.8 °F (36.6 °C)   Ht 5' 6" (1.676 m)   Wt 111.5 kg (245 lb 12.8 oz)   SpO2 95%   BMI 39.67 kg/m²       Physical Exam  Gen: well-groomed, well-dressed. No apparent distress  HEENT:  NCAT, symmetric  Pulm: normal work of breathing, no accessory muscle use; speaking complete sentences without having to stop  Neuro: CN II-XII grossly normal   Psych: pleasant affect, congruent mood. Linear thought; good eye contact  SKIN: no rashes present on face, neck, hands       Assessment and Plan     Problem List Items Addressed This Visit       Hypertension    Prediabetes - Primary    Relevant Orders    Hemoglobin A1C (Completed)     Other Visit Diagnoses         Hypothyroidism, unspecified type          CKD stage 3b, GFR 30-44 ml/min                Prediabetes - repeat A1c today; has not lost much weight.  Hypothyroid - did not repeat TSH as recommended. Will recheck today, likely need to uptitrate this  CKD 3b - monitor kidney function.   HTN - continue current medical regimen, this is effective for him and we finally have better control of his BP.     "

## 2025-03-06 LAB
ALBUMIN SERPL BCP-MCNC: 3.8 G/DL (ref 3.5–5)
ALBUMIN/GLOB SERPL: 1 {RATIO}
ALP SERPL-CCNC: 62 U/L (ref 40–150)
ALT SERPL W P-5'-P-CCNC: 19 U/L
ANION GAP SERPL CALCULATED.3IONS-SCNC: 14 MMOL/L (ref 7–16)
AST SERPL W P-5'-P-CCNC: 37 U/L (ref 5–34)
BILIRUB SERPL-MCNC: 0.4 MG/DL
BUN SERPL-MCNC: 34 MG/DL (ref 8–26)
BUN/CREAT SERPL: 17 (ref 6–20)
CALCIUM SERPL-MCNC: 10 MG/DL (ref 8.4–10.2)
CHLORIDE SERPL-SCNC: 107 MMOL/L (ref 98–107)
CO2 SERPL-SCNC: 25 MMOL/L (ref 22–29)
CREAT SERPL-MCNC: 1.95 MG/DL (ref 0.72–1.25)
EGFR (NO RACE VARIABLE) (RUSH/TITUS): 39 ML/MIN/1.73M2
EST. AVERAGE GLUCOSE BLD GHB EST-MCNC: 128 MG/DL
GLOBULIN SER-MCNC: 3.9 G/DL (ref 2–4)
GLUCOSE SERPL-MCNC: 72 MG/DL (ref 74–100)
HBA1C MFR BLD HPLC: 6.1 %
POTASSIUM SERPL-SCNC: 4.7 MMOL/L (ref 3.5–5.1)
PROT SERPL-MCNC: 7.7 G/DL (ref 6.4–8.3)
SODIUM SERPL-SCNC: 141 MMOL/L (ref 136–145)
TSH SERPL DL<=0.005 MIU/L-ACNC: 1.73 UIU/ML (ref 0.35–4.94)

## 2025-03-11 ENCOUNTER — TELEPHONE (OUTPATIENT)
Dept: FAMILY MEDICINE | Facility: CLINIC | Age: 60
End: 2025-03-11
Payer: MEDICAID

## 2025-03-11 ENCOUNTER — RESULTS FOLLOW-UP (OUTPATIENT)
Dept: FAMILY MEDICINE | Facility: CLINIC | Age: 60
End: 2025-03-11

## 2025-03-11 NOTE — TELEPHONE ENCOUNTER
----- Message from Dmitri Stover MD sent at 3/11/2025  8:53 AM CDT -----  Please contact the patient and let them know that their results were fine and do not require any change in treatment. Keep working on his diet.   ----- Message -----  From: Lab, Background User  Sent: 3/6/2025   9:49 PM CDT  To: Dmitri Stover MD

## 2025-04-07 ENCOUNTER — OFFICE VISIT (OUTPATIENT)
Dept: NEPHROLOGY | Facility: CLINIC | Age: 60
End: 2025-04-07
Payer: MEDICAID

## 2025-04-07 VITALS
WEIGHT: 235.63 LBS | HEART RATE: 75 BPM | SYSTOLIC BLOOD PRESSURE: 124 MMHG | DIASTOLIC BLOOD PRESSURE: 80 MMHG | RESPIRATION RATE: 18 BRPM | BODY MASS INDEX: 37.87 KG/M2 | OXYGEN SATURATION: 98 % | HEIGHT: 66 IN

## 2025-04-07 DIAGNOSIS — B37.9 YEAST INFECTION: ICD-10-CM

## 2025-04-07 DIAGNOSIS — I12.9 HYPERTENSIVE NEPHROSCLEROSIS, STAGE 1 THROUGH STAGE 4 OR UNSPECIFIED CHRONIC KIDNEY DISEASE: ICD-10-CM

## 2025-04-07 DIAGNOSIS — N18.32 STAGE 3B CHRONIC KIDNEY DISEASE: Primary | ICD-10-CM

## 2025-04-07 PROCEDURE — 99214 OFFICE O/P EST MOD 30 MIN: CPT | Mod: PBBFAC | Performed by: NURSE PRACTITIONER

## 2025-04-07 PROCEDURE — 1159F MED LIST DOCD IN RCRD: CPT | Mod: CPTII,,, | Performed by: NURSE PRACTITIONER

## 2025-04-07 PROCEDURE — 99999 PR PBB SHADOW E&M-EST. PATIENT-LVL IV: CPT | Mod: PBBFAC,,, | Performed by: NURSE PRACTITIONER

## 2025-04-07 PROCEDURE — 3008F BODY MASS INDEX DOCD: CPT | Mod: CPTII,,, | Performed by: NURSE PRACTITIONER

## 2025-04-07 PROCEDURE — 3044F HG A1C LEVEL LT 7.0%: CPT | Mod: CPTII,,, | Performed by: NURSE PRACTITIONER

## 2025-04-07 PROCEDURE — 3079F DIAST BP 80-89 MM HG: CPT | Mod: CPTII,,, | Performed by: NURSE PRACTITIONER

## 2025-04-07 PROCEDURE — 3074F SYST BP LT 130 MM HG: CPT | Mod: CPTII,,, | Performed by: NURSE PRACTITIONER

## 2025-04-07 PROCEDURE — 4010F ACE/ARB THERAPY RXD/TAKEN: CPT | Mod: CPTII,,, | Performed by: NURSE PRACTITIONER

## 2025-04-07 PROCEDURE — 99213 OFFICE O/P EST LOW 20 MIN: CPT | Mod: S$PBB,,, | Performed by: NURSE PRACTITIONER

## 2025-04-07 PROCEDURE — 3066F NEPHROPATHY DOC TX: CPT | Mod: CPTII,,, | Performed by: NURSE PRACTITIONER

## 2025-04-07 RX ORDER — FLUCONAZOLE 150 MG/1
150 TABLET ORAL DAILY
Qty: 1 TABLET | Refills: 0 | Status: SHIPPED | OUTPATIENT
Start: 2025-04-07 | End: 2025-04-08

## 2025-04-07 NOTE — PROGRESS NOTES
[facility] History and Physical  Patient Name: Clay Curtis  MRN: [id number]  Age: 59 y.o.  : 1965    Date: 2025 1:33 PM    Chief Complaint: Follow Up    HPI :   Mr Curtis presents to Nephrology clinic for routine follow up. He is followed by Dr. Jolanta MD as his primary care provider.     HTN: diagnosed in . Current regimen includes amlodipine 10 mg daily, Valsartan 160 mg daily, hydrochlorothiazide 12.5 mg daily. Well controlled.     Rheumatoid /Osteoarthritis: follows with [facility]. Xeljanz.     Gout: allopurinol.     Nephrology history: No FH of kidney disease, no nephrolithiasis, or recurrent UTIs. No OTC medications including NSAIDs. Used to take advil as needed for pain.     Patient denies any CP, SOB, peripheral edema, dysuria, hematuria, changes in urinary habits, or increased frequency of urination.     Past Medical History:  has a past medical history of Hypertension, Mixed hyperlipidemia, and Oligoarthritis (2022).     Past Surgical History:   has a past surgical history that includes Cardiac surgery.     Family History:  family history includes Diabetes in his brother, mother, and sister; Hypertension in his brother, mother, and sister. No family history of kidney disease.     Social History:   reports that he has never smoked. He has never been exposed to tobacco smoke. His smokeless tobacco use includes snuff. He reports that he does not currently use alcohol after a past usage of about 12.0 standard drinks of alcohol per week. He reports that he does not currently use drugs.     Allergies: is allergic to tropicamide and atorvastatin.     Medications: Reviewed including OTC medications, herbal supplements, and NSAIDS.     Old records have been reviewed.      Review of Systems:  ROS: A 10 point ROS was completed and found to be negative except for that mentioned above.          Physical Exam:  Vitals:    25 0919    BP: 124/80   Pulse: 75   Resp: 18       Constitutional: sitting in chair, in NAD  Eyes: EOMI, white sclera  ENMT: moist mucus membranes, nares patent  Cardiovascular: normal rate, S1/S2 noted, no edema  Respiratory: symmetrical chest expansion, CTA-B  Gastrointestinal: +BS, soft, NT/ND  Musculoskeletal: normal, no joint erythema/effusions  Skin: no rash, no purpura, warm extremities  Neurological: Alert and Oriented x 3, afocal    Labs:   Lab Results   Component Value Date     03/04/2025    K 4.7 03/04/2025    CREATININE 1.95 (H) 03/04/2025    ALT 19 03/04/2025    AST 37 (H) 03/04/2025    HGBA1C 6.1 03/04/2025    LDLCALC 131 07/22/2024    CHOL 197 07/22/2024    HDL 45 07/22/2024    TRIG 104 07/22/2024    TSH 1.728 03/04/2025    WBC 7.67 06/11/2024    HGB 14.3 06/11/2024    HCT 44.3 06/11/2024     06/11/2024        Otherwise Reviewed    Assessment/Plan:       CKD stage III in setting of hypertensive nephrosclerosis. Baseline sCr 1.4-1.7, recent sCr 1.74. Counseled to avoid nephrotoxic agents such as NSAIDs.     Proteinuria: urine Prot:Creat ratio. Patient is on RAAS blockade with ARB.    Anemia: CBC reviewed    BMD: Calcium and Phosphorus PTH, Vit D     HTN: well controlled with current meds. Started Valsartan 160 mg at last visit for CKD; Jardiance 10 mg daily added by PCP in December, is having some yeast infections per patient report, advised to increase water intake and keep up with this. May not tolerate Jardiance well, Diflucan sent in today. Patient is drinking sweet tea, sodas, and water. Will give samples of Jardiance 10 mg daily today.     RTC 6 months with CBC, RFP, UA, urine for Prot:creat ratio, PTH, Vit D      Signature:             ROYER Proctor  [facility]  [address] [facility] [address]  [phone]        20 minutes of total time spent on the encounter, which includes face to face time and non-face to face time preparing  to see the patient (eg, review of tests), Obtaining and/or reviewing separately obtained history, Documenting clinical information in the electronic or other health record, Independently interpreting results (not separately reported) and communicating results to the patient/family/caregiver, or Care coordination (not separately reported).       Date of encounter: 4/7/25

## 2025-04-09 ENCOUNTER — TELEPHONE (OUTPATIENT)
Dept: NEPHROLOGY | Facility: CLINIC | Age: 60
End: 2025-04-09
Payer: MEDICAID

## 2025-04-09 NOTE — TELEPHONE ENCOUNTER
Spoke w/ , he stated that he is still having yeast from the Jardiance. He said that the diflucan helped up until today, and then the yeast came back. I let him know that I would talk with Francine and see what she wanted him to do and then give him a call back. He verbalized thanks.

## 2025-04-09 NOTE — TELEPHONE ENCOUNTER
----- Message from Sharee sent at 4/9/2025  2:33 PM CDT -----  Regarding: pt states that he is having a harmful effect from the jardiance and is requesting something different  Who Called: Clay Manuel is requesting assistance/information from provider's office.Preferred Method of Contact: Phone CallPatient's Preferred Phone Number on File: [phone] Best Call Back Number, if different:Additional Information: pt states that he is having a harmful effect from the jardiance and is requesting something different

## 2025-04-10 DIAGNOSIS — B37.9 YEAST INFECTION: Primary | ICD-10-CM

## 2025-04-10 RX ORDER — FLUCONAZOLE 150 MG/1
150 TABLET ORAL DAILY
Qty: 1 TABLET | Refills: 0 | Status: SHIPPED | OUTPATIENT
Start: 2025-04-10 | End: 2025-04-11

## 2025-04-10 NOTE — TELEPHONE ENCOUNTER
Spoke w/ , he is aware of the medication that is being sent in. He is aware to hold the jardiance for now.

## 2025-04-10 NOTE — TELEPHONE ENCOUNTER
----- Message from ROYER Fortune sent at 4/10/2025  7:56 AM CDT -----  Regarding: RE: pt states that he is having a harmful effect from the jardiance and is requesting something different  Lets send another Diflucan in for him (150 mg one tablet PO #1 0 refills).... and he needs to hold Jardiance. We can retry it later if he wants to but if he's having recurrent yeast infections he is just not tolerating it right now.  ----- Message -----  From: Quintin Ingram LPN  Sent: 4/9/2025   2:41 PM CDT  To: ROYER Alicea  Subject: FW: pt states that he is having a harmful ef#    Patient states that he is still having yeast. He stated that the diflucan helped and then the yeast come back today. Wants to know what else he can have?  ----- Message -----  From: Sharee Quinones  Sent: 4/9/2025   2:35 PM CDT  To: Kimberly Escamilla Staff  Subject: pt states that he is having a harmful effect#    Who Called: Clay Manuel is requesting assistance/information from provider's office.Preferred Method of Contact: Phone CallPatient's Preferred Phone Number on File: [phone] Best Call Back Number, if different:Additional Information: pt states that he is having a harmful effect from the jardiance and is requesting something different

## 2025-05-29 ENCOUNTER — CLINICAL SUPPORT (OUTPATIENT)
Dept: FAMILY MEDICINE | Facility: CLINIC | Age: 60
End: 2025-05-29
Payer: MEDICAID

## 2025-05-29 VITALS — DIASTOLIC BLOOD PRESSURE: 78 MMHG | SYSTOLIC BLOOD PRESSURE: 117 MMHG | HEART RATE: 58 BPM

## 2025-05-29 DIAGNOSIS — I10 HYPERTENSION, UNSPECIFIED TYPE: Primary | ICD-10-CM

## 2025-06-16 DIAGNOSIS — R09.81 SINUS CONGESTION: ICD-10-CM

## 2025-06-17 RX ORDER — LORATADINE 10 MG/1
10 TABLET ORAL
Qty: 30 TABLET | Refills: 3 | Status: SHIPPED | OUTPATIENT
Start: 2025-06-17

## 2025-06-26 DIAGNOSIS — I10 ESSENTIAL HYPERTENSION: Primary | ICD-10-CM

## 2025-06-27 RX ORDER — VALSARTAN 160 MG/1
160 TABLET ORAL DAILY
Qty: 90 TABLET | Refills: 3 | OUTPATIENT
Start: 2025-06-27 | End: 2026-06-27

## 2025-06-30 ENCOUNTER — OFFICE VISIT (OUTPATIENT)
Dept: FAMILY MEDICINE | Facility: CLINIC | Age: 60
End: 2025-06-30
Payer: MEDICAID

## 2025-06-30 VITALS
SYSTOLIC BLOOD PRESSURE: 127 MMHG | OXYGEN SATURATION: 95 % | BODY MASS INDEX: 36.38 KG/M2 | RESPIRATION RATE: 20 BRPM | DIASTOLIC BLOOD PRESSURE: 83 MMHG | HEART RATE: 99 BPM | WEIGHT: 226.38 LBS | TEMPERATURE: 99 F | HEIGHT: 66 IN

## 2025-06-30 DIAGNOSIS — S29.011A CHEST WALL MUSCLE STRAIN, INITIAL ENCOUNTER: ICD-10-CM

## 2025-06-30 DIAGNOSIS — Z76.0 MEDICATION REFILL: ICD-10-CM

## 2025-06-30 DIAGNOSIS — I10 HYPERTENSION, UNSPECIFIED TYPE: Primary | ICD-10-CM

## 2025-06-30 RX ORDER — HYDROCHLOROTHIAZIDE 12.5 MG/1
12.5 CAPSULE ORAL DAILY
Qty: 30 CAPSULE | Refills: 5 | Status: SHIPPED | OUTPATIENT
Start: 2025-06-30 | End: 2025-12-27

## 2025-06-30 RX ORDER — VALSARTAN 160 MG/1
160 TABLET ORAL DAILY
Qty: 90 TABLET | Refills: 3 | Status: SHIPPED | OUTPATIENT
Start: 2025-06-30 | End: 2026-06-30

## 2025-06-30 RX ORDER — METHYLPREDNISOLONE ACETATE 40 MG/ML
40 INJECTION, SUSPENSION INTRA-ARTICULAR; INTRALESIONAL; INTRAMUSCULAR; SOFT TISSUE
Status: COMPLETED | OUTPATIENT
Start: 2025-06-30 | End: 2025-06-30

## 2025-06-30 RX ORDER — DICLOFENAC SODIUM 10 MG/G
2 GEL TOPICAL 4 TIMES DAILY
Qty: 50 G | Refills: 1 | Status: SHIPPED | OUTPATIENT
Start: 2025-06-30

## 2025-06-30 RX ADMIN — METHYLPREDNISOLONE ACETATE 40 MG: 40 INJECTION, SUSPENSION INTRA-ARTICULAR; INTRALESIONAL; INTRAMUSCULAR; SOFT TISSUE at 11:06

## 2025-06-30 NOTE — PATIENT INSTRUCTIONS
Rest and elevate the affected painful area.  Apply cold compresses intermittently as needed.  As pain recedes, begin normal activities slowly as tolerated.  Call if symptoms persist.

## 2025-06-30 NOTE — PROGRESS NOTES
Subjective     Patient ID: Clay Curtis is a 60 y.o. male.    Chief Complaint: Chest Pain (Reports pain to right chest wall area that goes around to back  and hurts in right shoulder also states it hurts  when taking a deep breath. Started this morning when waking up. Also reports that at work he moves large cases of meat around. Says this is the first time to ever have this pain. Denies any dizziness headache or shortness of breath) and Health Maintenance (TETANUS VACCINE Never done/Shingles Vaccine(1 of 2) Never done/Pneumococcal Vaccines (Age 50+)(1 of 1 - PCV) Never done/RSV Vaccine (Age 60+ and Pregnant patients)(1 - Risk 60-74 years 1-dose series) Never done/Encouraged to go to pharmacy for shingles rsv and tetanus does not want pneumonia vaccine today )    Chest Pain       Presents with pain to right chest since waking up this am. Increases with deep breathing and moving. Denies any nausea or SOB. 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    Review of Systems   Cardiovascular:  Positive for chest pain.          Objective     Physical Exam  Constitutional:       Appearance: Normal appearance. He is not ill-appearing.   HENT:      Head: Normocephalic.      Mouth/Throat:      Mouth: Mucous membranes are moist.   Eyes:      Pupils: Pupils are equal, round, and reactive to light.   Cardiovascular:      Rate and Rhythm: Normal rate and regular rhythm.      Pulses: Normal pulses.      Heart sounds: Normal heart sounds.   Pulmonary:      Effort: Pulmonary effort is normal.      Breath sounds: Normal breath sounds.   Musculoskeletal:         General: Tenderness (pain with palpation to right lateral pectoral muscle, + increased pain if clasp hands and pushes against each other, + pain if pushes resistance from my arm with right arm) present. Normal range of motion.   Skin:     General: Skin is warm and dry.      Capillary Refill: Capillary refill takes less than 2 seconds.    Neurological:      Mental Status: He is alert and oriented to person, place, and time.   Psychiatric:         Mood and Affect: Mood normal.         Behavior: Behavior normal.     (Independently Interpreted by the ED Physician)  Initial Reading: No STEMI.  Rhythm: Normal Sinus Rhythm.  Heart Rate: 94.  Ectopy: PACs.  ST Segments: Normal ST Segments.  T Waves Flipped: II, V5, V6 and V3.  Axis: Left Axis Deviation.  Comments: MARIEL 128 ms, QRS dur 92 ms,  ms,  ms, PRT Axis 62, -27, 143           Assessment and Plan     1. Hypertension, unspecified type  -     hydroCHLOROthiazide (MICROZIDE) 12.5 mg capsule; Take 1 capsule (12.5 mg total) by mouth once daily.  Dispense: 30 capsule; Refill: 5    2. Chest wall muscle strain, initial encounter  -     EKG 12-lead; Future    3. Medication refill    Other orders  -     valsartan (DIOVAN) 160 MG tablet; Take 1 tablet (160 mg total) by mouth once daily.  Dispense: 90 tablet; Refill: 3  -     methylPREDNISolone acetate injection 40 mg  -     diclofenac sodium (VOLTAREN ARTHRITIS PAIN) 1 % Gel; Apply 2 g topically 4 (four) times daily.  Dispense: 50 g; Refill: 1        Patient complaint consistent with Chest wall muscle strain. Patient advised EKG is abnormal without any acute findings. His GFR is low so oral NSAIDS not advised.          Follow up if symptoms worsen or fail to improve.